# Patient Record
Sex: FEMALE | Race: OTHER | NOT HISPANIC OR LATINO | Employment: PART TIME | URBAN - METROPOLITAN AREA
[De-identification: names, ages, dates, MRNs, and addresses within clinical notes are randomized per-mention and may not be internally consistent; named-entity substitution may affect disease eponyms.]

---

## 2017-08-16 ENCOUNTER — HOSPITAL ENCOUNTER (OUTPATIENT)
Facility: MEDICAL CENTER | Age: 32
End: 2017-08-16
Attending: NURSE PRACTITIONER
Payer: COMMERCIAL

## 2017-08-16 ENCOUNTER — OFFICE VISIT (OUTPATIENT)
Dept: URGENT CARE | Facility: PHYSICIAN GROUP | Age: 32
End: 2017-08-16
Payer: COMMERCIAL

## 2017-08-16 VITALS
DIASTOLIC BLOOD PRESSURE: 70 MMHG | OXYGEN SATURATION: 98 % | HEART RATE: 95 BPM | WEIGHT: 137 LBS | SYSTOLIC BLOOD PRESSURE: 110 MMHG | HEIGHT: 67 IN | RESPIRATION RATE: 12 BRPM | TEMPERATURE: 99.1 F | BODY MASS INDEX: 21.5 KG/M2

## 2017-08-16 DIAGNOSIS — N89.8 VAGINAL ITCHING: ICD-10-CM

## 2017-08-16 DIAGNOSIS — N30.01 ACUTE CYSTITIS WITH HEMATURIA: ICD-10-CM

## 2017-08-16 LAB
APPEARANCE UR: NORMAL
BILIRUB UR STRIP-MCNC: NORMAL MG/DL
COLOR UR AUTO: YELLOW
GLUCOSE UR STRIP.AUTO-MCNC: NORMAL MG/DL
INT CON NEG: NEGATIVE
INT CON POS: POSITIVE
KETONES UR STRIP.AUTO-MCNC: NORMAL MG/DL
LEUKOCYTE ESTERASE UR QL STRIP.AUTO: NORMAL
NITRITE UR QL STRIP.AUTO: NORMAL
PH UR STRIP.AUTO: 6 [PH] (ref 5–8)
POC URINE PREGNANCY TEST: NORMAL
PROT UR QL STRIP: NORMAL MG/DL
RBC UR QL AUTO: NORMAL
SP GR UR STRIP.AUTO: 1.01
UROBILINOGEN UR STRIP-MCNC: NORMAL MG/DL

## 2017-08-16 PROCEDURE — 81002 URINALYSIS NONAUTO W/O SCOPE: CPT | Performed by: NURSE PRACTITIONER

## 2017-08-16 PROCEDURE — 81025 URINE PREGNANCY TEST: CPT | Performed by: NURSE PRACTITIONER

## 2017-08-16 PROCEDURE — 99204 OFFICE O/P NEW MOD 45 MIN: CPT | Performed by: NURSE PRACTITIONER

## 2017-08-16 RX ORDER — FLUCONAZOLE 150 MG/1
150 TABLET ORAL DAILY
Qty: 1 TAB | Refills: 0 | Status: SHIPPED | OUTPATIENT
Start: 2017-08-16 | End: 2017-10-09

## 2017-08-16 RX ORDER — NITROFURANTOIN 25; 75 MG/1; MG/1
100 CAPSULE ORAL 2 TIMES DAILY
Qty: 10 CAP | Refills: 0 | Status: SHIPPED | OUTPATIENT
Start: 2017-08-16 | End: 2017-08-21

## 2017-08-16 ASSESSMENT — PAIN SCALES - GENERAL: PAINLEVEL: 5=MODERATE PAIN

## 2017-08-16 NOTE — PROGRESS NOTES
Chief Complaint   Patient presents with   • UTI     Symtoms of burning, urgency, and blood in urine x5 days       HISTORY OF PRESENT ILLNESS: Patient is a 31 y.o. female who presents today due to five days of urinary burning, urgency, and frequency. Reports some associated pelvic pressure, vaginal itching and a few episodes hematuria. Denies fever, flank pain, N/V. Denies a history of pyelononephritis or kidney stones. Denies a history of UTIs.    Patient Active Problem List    Diagnosis Date Noted   • Postpartum care and examination 2013       Allergies:Review of patient's allergies indicates no known allergies.    Current Outpatient Prescriptions Ordered in [facility]   Medication Sig Dispense Refill   • nitrofurantoin monohydr macro (MACROBID) 100 MG Cap Take 1 Cap by mouth 2 times a day for 5 days. 10 Cap 0   • fluconazole (DIFLUCAN) 150 MG tablet Take 1 Tab by mouth every day. 1 Tab 0   • Norethin-Eth Estrad Biphasic (NECON 10/, 28,) 0.5-35/1-35 MG-MCG TABS Take 1 Tab by mouth every day. 28 Tab 11     No current [facility]-ordered facility-administered medications on file.       Past Medical History   Diagnosis Date   • Anemia    • Headache    • Hypertension      PIH last 2 weeks of pregnancy       Social History   Substance Use Topics   • Smoking status: Never Smoker    • Smokeless tobacco: Never Used   • Alcohol Use: No       Family Status   Relation Status Death Age   • Mother Alive    • Father Alive    • Brother Alive    • Maternal Grandmother     • Maternal Grandfather     • Paternal Grandmother Alive    • Paternal Grandfather       Family History   Problem Relation Age of Onset   • Hypertension Mother    • Hyperlipidemia Mother    • Cancer Maternal Grandmother    • Other Maternal Grandmother      brain tumor       ROS:  Review of Systems   Constitutional: Negative for fever, chills, weight loss and malaise/fatigue.   HENT: Negative for ear pain, nosebleeds, congestion, sore  "throat and neck pain.    Eyes: Negative for vision changes.   Neuro: Negative for headache, sensory changes, weakness, seizure, LOC.   Cardiovascular: Negative for chest pain, palpitations, orthopnea and leg swelling.   Respiratory: Negative for cough, sputum production, shortness of breath and wheezing.   Gastrointestinal: Positive for lower abdominal pressure. Negative for abdominal pain, nausea, vomiting or diarrhea.   Genitourinary: Positive for dysuria, urgency, frequency, hematuria, vaginal itching. Negative for vaginal discharge or flank pain.   Skin: Negative for rash, diaphoresis.     Exam:  Blood pressure 110/70, pulse 95, temperature 37.3 °C (99.1 °F), resp. rate 12, height 1.702 m (5' 7.01\"), weight 62.143 kg (137 lb), last menstrual period 07/25/2017, SpO2 98 %, not currently breastfeeding.  General: well-nourished, well-developed female in NAD  Head: normocephalic, atraumatic  Eyes: PERRLA, no conjunctival injection, acuity grossly intact, lids normal.  Lymph: no cervical adenopathy. No supraclavicular adenopathy.   Neuro: alert and oriented. Cranial nerves 1-12 grossly intact. No sensory deficit.   Cardiovascular: regular rate and rhythm. No edema.  Pulmonary: no distress. Chest is symmetrical with respiration, no wheezes, crackles, or rhonchi.   Abdomen: soft, non-tender, no guarding, no hepatosplenomegaly. No CVA tenderness.   Musculoskeletal: no clubbing, appropriate muscle tone, gait is stable.  Skin: warm, dry, intact, no clubbing, no cyanosis, no rashes.   Psych: appropriate mood, affect, judgement.         Assessment/Plan:  1. Acute cystitis with hematuria  nitrofurantoin monohydr macro (MACROBID) 100 MG Cap    URINE CULTURE(NEW)    POCT Urinalysis    POCT Pregnancy   2. Vaginal itching  fluconazole (DIFLUCAN) 150 MG tablet         Antibiotic as directed. Increase fluid intake. Urine sent for culture. Contingent diflucan prescription given to patient to fill upon meeting criteria of guidelines " discussed.   Supportive care, differential diagnoses, and indications for immediate follow-up discussed with patient.   Pathogenesis of diagnosis discussed including typical length and natural progression.   Instructed to return to clinic or nearest emergency department for any change in condition, further concerns, or worsening of symptoms.  Patient states understanding of the plan of care and discharge instructions.  Instructed to make an appointment, for follow up, with their primary care provider.        Please note that this dictation was created using voice recognition software. I have made every reasonable attempt to correct obvious errors, but I expect that there are errors of grammar and possibly content that I did not discover before finalizing the note.      SHERINE Sinclair.

## 2017-08-16 NOTE — MR AVS SNAPSHOT
"        Naila Mcclelland   2017 3:15 PM   Office Visit   MRN: [id number]    Department:  [facility]   Dept Phone:  [phone]    Description:  Female : 1985   Provider:  RAFAEL Angela           Reason for Visit     UTI Symtoms of burning, urgency, and blood in urine x5 days      Allergies as of 2017     No Known Allergies      You were diagnosed with     Acute cystitis with hematuria   [859457]       Vaginal itching   [[id number]]         Vital Signs     Blood Pressure Pulse Temperature Respirations Height Weight    110/70 mmHg 95 37.3 °C (99.1 °F) 12 1.702 m (5' 7.01\") 62.143 kg (137 lb)    Body Mass Index Oxygen Saturation Last Menstrual Period Breastfeeding? Smoking Status       21.45 kg/m2 98% 2017 No Never Smoker        Basic Information     Date Of Birth Sex Race Ethnicity Preferred Language    1985 Female Other Non- English      Problem List              ICD-10-CM Priority Class Noted - Resolved    Postpartum care and examination    2013 - Present      Health Maintenance        Date Due Completion Dates    PAP SMEAR 2015    IMM INFLUENZA (1) 2017 1/3/2012    IMM DTaP/Tdap/Td Vaccine (2 - Td) 2023            Results     POCT Urinalysis      Component Value Standard Range & Units    POC Color YELLOW Negative    POC Appearance CLOUDY Negative    POC Leukocyte Esterase MOD Negative    POC Nitrites NEG Negative    POC Urobiligen NEG Negative (0.2) mg/dL    POC Protein NEG Negative mg/dL    POC Urine PH 6.0 5.0 - 8.0    POC Blood LG Negative    POC Specific Gravity 1.010 <1.005 - >1.030    POC Ketones NEG Negative mg/dL    POC Biliruben NEG Negative mg/dL    POC Glucose NEG Negative mg/dL                POCT Pregnancy      Component Value Standard Range & Units    POC Urine Pregnancy Test neg Negative    Internal Control Positive Positive     Internal Control Negative Negative                         Current Immunizations    " Influenza TIV (IM) 1/3/2012    MMR/Varicella Combined Vaccine  Incomplete    Tdap Vaccine  Incomplete, 7/9/2013 11:45 PM      Below and/or attached are the medications your provider expects you to take. Review all of your home medications and newly ordered medications with your provider and/or pharmacist. Follow medication instructions as directed by your provider and/or pharmacist. Please keep your medication list with you and share with your provider. Update the information when medications are discontinued, doses are changed, or new medications (including over-the-counter products) are added; and carry medication information at all times in the event of emergency situations     Allergies:  No Known Allergies          Medications  Valid as of: August 16, 2017 -  6:49 PM    Generic Name Brand Name Tablet Size Instructions for use    Fluconazole (Tab) DIFLUCAN 150 MG Take 1 Tab by mouth every day.        Nitrofurantoin Monohyd Macro (Cap) MACROBID 100 MG Take 1 Cap by mouth 2 times a day for 5 days.        Norethin-Eth Estrad Biphasic (Tab) Norethin-Eth Estrad Biphasic 0.5-35/1-35 MG-MCG Take 1 Tab by mouth every day.        .                 Medicines prescribed today were sent to:     [facility] [address] [place] [address] - [address] [facility]    [address] [facility] [address]    Phone: [phone] Fax: [phone]    Open 24 Hours?: No      Medication refill instructions:       If your prescription bottle indicates you have medication refills left, it is not necessary to call your provider’s office. Please contact your pharmacy and they will refill your medication.    If your prescription bottle indicates you do not have any refills left, you may request refills at any time through one of the following ways: The online [employer] system (except Urgent Care), by calling your provider’s office, or by asking your pharmacy to contact your provider’s office with a refill request.  Medication refills are processed only during regular business hours and may not be available until the next business day. Your provider may request additional information or to have a follow-up visit with you prior to refilling your medication.   *Please Note: Medication refills are assigned a new Rx number when refilled electronically. Your pharmacy may indicate that no refills were authorized even though a new prescription for the same medication is available at the pharmacy. Please request the medicine by name with the pharmacy before contacting your provider for a refill.        Your To Do List     Future Labs/Procedures Complete By Expires    URINE CULTURE(NEW)  As directed 8/16/2018         [employer] Access Code: Activation code not generated  Current [employer] Status: Active

## 2017-09-06 ENCOUNTER — OFFICE VISIT (OUTPATIENT)
Dept: URGENT CARE | Facility: PHYSICIAN GROUP | Age: 32
End: 2017-09-06

## 2017-09-06 ENCOUNTER — HOSPITAL ENCOUNTER (OUTPATIENT)
Facility: MEDICAL CENTER | Age: 32
End: 2017-09-06
Attending: NURSE PRACTITIONER
Payer: COMMERCIAL

## 2017-09-06 VITALS
BODY MASS INDEX: 21.19 KG/M2 | RESPIRATION RATE: 14 BRPM | OXYGEN SATURATION: 100 % | HEART RATE: 95 BPM | SYSTOLIC BLOOD PRESSURE: 112 MMHG | HEIGHT: 67 IN | DIASTOLIC BLOOD PRESSURE: 74 MMHG | WEIGHT: 135 LBS | TEMPERATURE: 98.2 F

## 2017-09-06 DIAGNOSIS — R30.0 DYSURIA: ICD-10-CM

## 2017-09-06 DIAGNOSIS — N39.0 URINARY TRACT INFECTION WITH HEMATURIA, SITE UNSPECIFIED: ICD-10-CM

## 2017-09-06 DIAGNOSIS — R31.9 URINARY TRACT INFECTION WITH HEMATURIA, SITE UNSPECIFIED: ICD-10-CM

## 2017-09-06 LAB
APPEARANCE UR: CLEAR
BILIRUB UR STRIP-MCNC: NEGATIVE MG/DL
COLOR UR AUTO: YELLOW
GLUCOSE UR STRIP.AUTO-MCNC: NEGATIVE MG/DL
KETONES UR STRIP.AUTO-MCNC: NEGATIVE MG/DL
LEUKOCYTE ESTERASE UR QL STRIP.AUTO: NORMAL
NITRITE UR QL STRIP.AUTO: NEGATIVE
PH UR STRIP.AUTO: 7 [PH] (ref 5–8)
PROT UR QL STRIP: NEGATIVE MG/DL
RBC UR QL AUTO: NORMAL
SP GR UR STRIP.AUTO: 1
UROBILINOGEN UR STRIP-MCNC: NEGATIVE MG/DL

## 2017-09-06 PROCEDURE — 81002 URINALYSIS NONAUTO W/O SCOPE: CPT | Performed by: NURSE PRACTITIONER

## 2017-09-06 PROCEDURE — 99214 OFFICE O/P EST MOD 30 MIN: CPT | Performed by: NURSE PRACTITIONER

## 2017-09-06 RX ORDER — SULFAMETHOXAZOLE AND TRIMETHOPRIM 800; 160 MG/1; MG/1
1 TABLET ORAL 2 TIMES DAILY
Qty: 6 TAB | Refills: 0 | Status: SHIPPED | OUTPATIENT
Start: 2017-09-06 | End: 2017-09-09

## 2017-09-06 RX ORDER — FLUCONAZOLE 150 MG/1
TABLET ORAL
Qty: 2 TAB | Refills: 1 | Status: SHIPPED | OUTPATIENT
Start: 2017-09-06 | End: 2017-10-09

## 2017-09-06 ASSESSMENT — ENCOUNTER SYMPTOMS
FLANK PAIN: 1
MYALGIAS: 0
CHILLS: 0
NAUSEA: 1
HEADACHES: 0
ABDOMINAL PAIN: 1
FEVER: 0
VOMITING: 0

## 2017-09-06 NOTE — PROGRESS NOTES
"Subjective:      Naila Mcclelland is a 31 y.o. female who presents with UTI (urinary pain x 1 month . lower left back pain and prev yeast infecton x 1 m )            UTI   This is a new problem. The current episode started 1 to 4 weeks ago. The problem occurs constantly. The problem has been unchanged. Associated symptoms include abdominal pain, nausea and urinary symptoms. Pertinent negatives include no chills, fever, headaches, myalgias or vomiting. Associated symptoms comments: + flank pain. Treatments tried: increasing fluids.   Allergies, medications and history reviewed by me today  Seen one month ago for similar symptoms. Was on macrobid.    Review of Systems   Constitutional: Negative for chills and fever.   Gastrointestinal: Positive for abdominal pain and nausea. Negative for vomiting.   Genitourinary: Positive for dysuria, flank pain and hematuria.   Musculoskeletal: Negative for myalgias.   Neurological: Negative for headaches.          Objective:     /74   Pulse 95   Temp 36.8 °C (98.2 °F)   Resp 14   Ht 1.702 m (5' 7\")   Wt 61.2 kg (135 lb)   LMP 09/30/2012 Comment: sure wrote it down  SpO2 100%   Breastfeeding? No   BMI 21.14 kg/m²      Physical Exam   Constitutional: She is oriented to person, place, and time. She appears well-developed and well-nourished. No distress.   Cardiovascular: Normal rate, regular rhythm and normal heart sounds.    No murmur heard.  Pulmonary/Chest: Effort normal and breath sounds normal. No respiratory distress.   Abdominal: Soft. Bowel sounds are normal. There is tenderness in the suprapubic area. There is no CVA tenderness.   Musculoskeletal: Normal range of motion.   Moves all 4 extremities normally   Neurological: She is alert and oriented to person, place, and time.   Skin: Skin is warm and dry.   Psychiatric: She has a normal mood and affect. Her behavior is normal. Thought content normal.   Nursing note and vitals reviewed.              Assessment/Plan: "     1. Dysuria  sulfamethoxazole-trimethoprim (BACTRIM DS) 800-160 MG tablet    fluconazole (DIFLUCAN) 150 MG tablet    URINE CULTURE(NEW)   2. Urinary tract infection with hematuria, site unspecified       Differential diagnosis, natural history, supportive care, and indications for immediate follow-up discussed at length.   Call with culture

## 2017-09-11 ENCOUNTER — TELEPHONE (OUTPATIENT)
Dept: URGENT CARE | Facility: PHYSICIAN GROUP | Age: 32
End: 2017-09-11

## 2017-09-12 ENCOUNTER — TELEPHONE (OUTPATIENT)
Dept: URGENT CARE | Facility: PHYSICIAN GROUP | Age: 32
End: 2017-09-12

## 2017-09-12 DIAGNOSIS — R31.9 URINARY TRACT INFECTION WITH HEMATURIA, SITE UNSPECIFIED: ICD-10-CM

## 2017-09-12 DIAGNOSIS — N39.0 URINARY TRACT INFECTION WITH HEMATURIA, SITE UNSPECIFIED: ICD-10-CM

## 2017-09-12 RX ORDER — NITROFURANTOIN 25; 75 MG/1; MG/1
100 CAPSULE ORAL 2 TIMES DAILY
Qty: 10 QUANTITY SUFFICIENT | Refills: 0 | Status: SHIPPED | OUTPATIENT
Start: 2017-09-12 | End: 2017-10-09

## 2017-09-12 RX ORDER — FLUCONAZOLE 150 MG/1
150 TABLET ORAL DAILY
Qty: 2 TAB | Refills: 1 | Status: SHIPPED | OUTPATIENT
Start: 2017-09-12 | End: 2017-10-09

## 2017-09-12 NOTE — TELEPHONE ENCOUNTER
Patient called requesting results of urine culture. + ecoli, resistant to bactrim. Sent macrobid and diflucan.   she will call if no resolution.

## 2017-10-09 ENCOUNTER — OFFICE VISIT (OUTPATIENT)
Dept: MEDICAL GROUP | Facility: PHYSICIAN GROUP | Age: 32
End: 2017-10-09
Payer: COMMERCIAL

## 2017-10-09 VITALS
HEART RATE: 94 BPM | BODY MASS INDEX: 21.97 KG/M2 | HEIGHT: 67 IN | WEIGHT: 140 LBS | TEMPERATURE: 97.5 F | OXYGEN SATURATION: 99 % | SYSTOLIC BLOOD PRESSURE: 102 MMHG | DIASTOLIC BLOOD PRESSURE: 68 MMHG

## 2017-10-09 DIAGNOSIS — Z00.00 HEALTHCARE MAINTENANCE: ICD-10-CM

## 2017-10-09 DIAGNOSIS — Z23 NEED FOR VACCINATION: ICD-10-CM

## 2017-10-09 DIAGNOSIS — N89.8 VAGINAL ITCHING: ICD-10-CM

## 2017-10-09 DIAGNOSIS — Z30.011 ENCOUNTER FOR INITIAL PRESCRIPTION OF CONTRACEPTIVE PILLS: ICD-10-CM

## 2017-10-09 PROCEDURE — 90686 IIV4 VACC NO PRSV 0.5 ML IM: CPT | Performed by: FAMILY MEDICINE

## 2017-10-09 PROCEDURE — 90471 IMMUNIZATION ADMIN: CPT | Performed by: FAMILY MEDICINE

## 2017-10-09 PROCEDURE — 99214 OFFICE O/P EST MOD 30 MIN: CPT | Mod: 25 | Performed by: FAMILY MEDICINE

## 2017-10-09 RX ORDER — LEVONORGESTREL AND ETHINYL ESTRADIOL 0.1-0.02MG
1 KIT ORAL DAILY
Qty: 28 TAB | Refills: 11 | Status: SHIPPED | OUTPATIENT
Start: 2017-10-09 | End: 2017-10-24

## 2017-10-09 ASSESSMENT — PAIN SCALES - GENERAL: PAINLEVEL: NO PAIN

## 2017-10-09 ASSESSMENT — PATIENT HEALTH QUESTIONNAIRE - PHQ9: CLINICAL INTERPRETATION OF PHQ2 SCORE: 0

## 2017-10-09 NOTE — ASSESSMENT & PLAN NOTE
Patient is also requesting to discuss contraceptive options. Upon discussing different options including IUD,  Hector, and Depo-Provera and OCPs, contraceptive patches etc., patient would like to try oral contraceptive pills. Does not have any history of blood clots, liver disease, migraine headaches. She has taken oral contraceptive pills in the past briefly. She is  with 2 living children and 2 abortions. She is currently sexually active with her  and has not been using any contraception.LMP : 10/07/17.Periods are regular about every 28-30 days.

## 2017-10-09 NOTE — ASSESSMENT & PLAN NOTE
Patient was recently treated for an urinary tract infection. She reports that since then she has been having vaginal itching, however her urinary symptoms have completely resolved. She was treated presumptively for yeast infection but that hasn't helped her itching. She denies any vaginal discharge or bad odor. She is currently having her menstrual period.

## 2017-10-09 NOTE — PROGRESS NOTES
Subjective:   Naila Mcclelland is a 31 y.o. female here today for establishing care and complaining of vaginal itching and discuss birth control options    Vaginal itching  Patient was recently treated for an urinary tract infection. She reports that since then she has been having vaginal itching, however her urinary symptoms have completely resolved. She was treated presumptively for yeast infection but that hasn't helped her itching. She denies any vaginal discharge or bad odor. She is currently having her menstrual period.    Encounter for initial prescription of contraceptive pills  Patient is also requesting to discuss contraceptive options. Upon discussing different options including IUD,  Hector, and Depo-Provera and OCPs, contraceptive patches etc., patient would like to try oral contraceptive pills. Does not have any history of blood clots, liver disease, migraine headaches. She has taken oral contraceptive pills in the past briefly. She is  with 2 living children and 2 abortions. She is currently sexually active with her  and has not been using any contraception.       Current medicines (including changes today)  Current Outpatient Prescriptions   Medication Sig Dispense Refill   • levonorgestrel-ethinyl estradiol (AVIANE, ALESSE, LESSINA) 0.1-20 MG-MCG per tablet Take 1 Tab by mouth every day. Start  after start of menstrual cycle 28 Tab 11     No current facility-administered medications for this visit.      She  has a past medical history of Anemia (); Headache(784.0); and Hypertension (). She also has no past medical history of Addisons disease (CMS-HCC); Adrenal disorder (CMS-HCC); Allergy; Anxiety; Arrhythmia; Arthritis; ASTHMA; Blood transfusion; Cancer (CMS-HCC); CATARACT; CHF (congestive heart failure) (CMS-HCC); Clotting disorder (CMS-HCC); COPD; Cushings syndrome (CMS-HCC); Depression; Diabetes; Diabetic neuropathy (CMS-HCC); EMPHYSEMA; GERD (gastroesophageal reflux disease);  "Glaucoma; Goiter; Heart attack; Heart murmur; HIV (human immunodeficiency virus infection); Hyperlipidemia; IBD (inflammatory bowel disease); Kidney disease; Meningitis; Migraine; Muscle disorder; OSTEOPOROSIS; Parathyroid disorder (CMS-[facility]); Pituitary disease (CMS-HCC); Seizure (CMS-HCC); Sickle cell disease (CMS-HCC); Stroke (CMS-HCC); Substance abuse; Thyroid disease; Tuberculosis; Ulcer (CMS-HCC); or Urinary tract infection, site not specified.    ROS   No chest pain, no shortness of breath, no abdominal pain       Objective:     Blood pressure 102/68, pulse 94, temperature 36.4 °C (97.5 °F), height 1.702 m (5' 7\"), weight 63.5 kg (140 lb), last menstrual period 10/07/2017, SpO2 99 %, not currently breastfeeding. Body mass index is 21.93 kg/m².     PHYSICAL EXAM     GEN: Alert and oriented,well appearing, no acute distress  SKIN: warm, dry to touch, no rashes or lesions in visible areas  PSYCH: mood and affect normal, judgement normal  EYES: Conjunctiva clear, lids normal,pupils equal round and reactive  ENMT: Normal external nose and ears,EACs normal appearing, TM pearly gray with normal light reflex bilaterally,nasal mucosa and turbinates normal appearing without erythema or edema, lips without lesions,good dentition,oropharynx clear  Neck : Trachea midline, no masses or swelling, no thyromegaly  LYMPHATIC : No cervical or supraclavicular lymphadenopathy  RESPIRATORY : Unlabored respiratory effort, no distress noted, clear to auscultation bilaterally, no wheeze, rhonchi, crackles  CARDIOVASCULAR: RRR, S1 S2 normal, no murmurs  MUSCULOSKELETAL : Normal gait and stance, no obvious abnormalities   NEURO: No overt focal neurologic deficits,sensation intact      Assessment and Plan:   The following treatment plan was discussed    1. Vaginal itching  New problem.Will collect wet prep today.await results and treat accordingly  - VAGINAL PATHOGENS DNA PANEL; Future    2. Encounter for initial prescription of " contraceptive pills  New problem  We discussed that birth control pills can increase risk of blood clots, liver tumors, gallbladder disease, and stroke. Side effects can include headache, constipation nausea. They do not protect against STDs. They are not effective if not taken everyday, if more than one pill is missed a backup method should be used.   - levonorgestrel-ethinyl estradiol (AVIANE, ALESSE, LESSINA) 0.1-20 MG-MCG per tablet; Take 1 Tab by mouth every day. Start Sunday after start of menstrual cycle  Dispense: 28 Tab; Refill: 11    3. Need for vaccination  - INFLUENZA VACCINE QUAD INJ >3Y(PF)    4. Healthcare maintenance  - CBC WITH DIFFERENTIAL; Future  - COMP METABOLIC PANEL; Future  - LIPID PROFILE; Future  - TSH; Future  - VITAMIN D,25 HYDROXY; Future      Followup: Return in about 2 weeks (around 10/23/2017) for Pap, GYN Exam.         Please note that this dictation was created using voice recognition software. I have made every reasonable attempt to correct obvious errors, but I expect that there are errors of grammar and possibly content that I did not discover before finalizing the note.

## 2017-10-10 ENCOUNTER — HOSPITAL ENCOUNTER (OUTPATIENT)
Facility: MEDICAL CENTER | Age: 32
End: 2017-10-10
Attending: FAMILY MEDICINE
Payer: COMMERCIAL

## 2017-10-14 RX ORDER — METRONIDAZOLE 500 MG/1
500 TABLET ORAL 2 TIMES DAILY
Qty: 14 TAB | Refills: 0 | Status: SHIPPED | OUTPATIENT
Start: 2017-10-14 | End: 2017-10-18

## 2017-10-16 ENCOUNTER — TELEPHONE (OUTPATIENT)
Dept: MEDICAL GROUP | Facility: PHYSICIAN GROUP | Age: 32
End: 2017-10-16

## 2017-10-16 DIAGNOSIS — E55.9 VITAMIN D DEFICIENCY: ICD-10-CM

## 2017-10-16 DIAGNOSIS — D50.9 MICROCYTIC ANEMIA: ICD-10-CM

## 2017-10-17 ENCOUNTER — TELEPHONE (OUTPATIENT)
Dept: MEDICAL GROUP | Facility: PHYSICIAN GROUP | Age: 32
End: 2017-10-17

## 2017-10-18 ENCOUNTER — TELEPHONE (OUTPATIENT)
Dept: MEDICAL GROUP | Facility: PHYSICIAN GROUP | Age: 32
End: 2017-10-18

## 2017-10-18 RX ORDER — METRONIDAZOLE 7.5 MG/G
GEL TOPICAL
Qty: 1 TUBE | Refills: 0 | Status: SHIPPED | OUTPATIENT
Start: 2017-10-18 | End: 2018-02-21

## 2017-10-19 NOTE — TELEPHONE ENCOUNTER
Please advise her to stop the oral medication.I have sent a prescription of a metronidazole vaginal gel.    Zaira Koroma M.D.

## 2017-10-19 NOTE — TELEPHONE ENCOUNTER
PT STATES THE ANTIBIOTIC IS MAKING HER SICK, IS THERE ANOTHER ONE SHE CAN TAKE THAT WILL HAVE LESS GI UPSET? PLEASE ADVISE

## 2017-10-24 ENCOUNTER — OFFICE VISIT (OUTPATIENT)
Dept: MEDICAL GROUP | Facility: PHYSICIAN GROUP | Age: 32
End: 2017-10-24
Payer: COMMERCIAL

## 2017-10-24 VITALS
SYSTOLIC BLOOD PRESSURE: 100 MMHG | HEART RATE: 111 BPM | OXYGEN SATURATION: 98 % | TEMPERATURE: 97.7 F | BODY MASS INDEX: 21.97 KG/M2 | WEIGHT: 140 LBS | HEIGHT: 67 IN | DIASTOLIC BLOOD PRESSURE: 64 MMHG

## 2017-10-24 DIAGNOSIS — N92.6 IRREGULAR MENSES: ICD-10-CM

## 2017-10-24 LAB
INT CON NEG: NEGATIVE
INT CON POS: POSITIVE
POC URINE PREGNANCY TEST: NORMAL

## 2017-10-24 PROCEDURE — 99214 OFFICE O/P EST MOD 30 MIN: CPT | Performed by: FAMILY MEDICINE

## 2017-10-24 PROCEDURE — 81025 URINE PREGNANCY TEST: CPT | Performed by: FAMILY MEDICINE

## 2017-10-24 RX ORDER — NORETHINDRONE ACETATE AND ETHINYL ESTRADIOL 1MG-20(21)
1 KIT ORAL DAILY
Qty: 28 TAB | Refills: 11 | Status: SHIPPED | OUTPATIENT
Start: 2017-10-24 | End: 2018-02-21

## 2017-10-24 ASSESSMENT — PAIN SCALES - GENERAL: PAINLEVEL: NO PAIN

## 2017-10-24 NOTE — PROGRESS NOTES
Subjective:   Naila Mcclelland is a 31 y.o. female here today for irregular periods and vaginal itching.    HPI :     Patient was recently started on oral contraceptive pills for contraception. She started taking the pills last Sunday along with metronidazole which was also prescribed for bacterial vaginitis. In a couple of days she started feeling very nauseous, sick in the stomach and her heart rate was increasing. Therefore she stopped taking the birth control pills and continued with the antibiotics, however she still had the symptoms. Therefore she stopped the antibiotics as well. She does have a prescription of metronidazole vaginal gel called in when she has not picked up for alternative treatment of bacterial vaginitis. The reason she is here today is because she started having vaginal bleeding 2 days back which looked like old dark blood. She was not intubated for a period. She has also been having mild abdominal cramps. She is still in her period but it has gotten lighter. No abdominal pain, fever, chills. Has been sexually active recently.    Current medicines (including changes today)  Current Outpatient Prescriptions   Medication Sig Dispense Refill   • norethindrone-ethinyl estradiol (JUNEL FE 1/20) 1-20 MG-MCG per tablet Take 1 Tab by mouth every day. 28 Tab 11   • Cholecalciferol 98866 UNIT Cap Take 1 capsule orally once weekly for 8 weeks, followed by 1 capsule twice monthly for maintenance. 30 Cap 0   • metronidazole (METROGEL) 0.75 % gel Insert 5g(one applicatorful) into the vagina once daily at night for 5 days 1 Tube 0     No current facility-administered medications for this visit.      She  has a past medical history of Anemia (2005); Headache(784.0); and Hypertension (2005). She also has no past medical history of Addisons disease (CMS-HCC); Adrenal disorder (CMS-HCC); Allergy; Anxiety; Arrhythmia; Arthritis; ASTHMA; Blood transfusion; Cancer (CMS-HCC); CATARACT; CHF (congestive heart failure)  "(CMS-[facility]); Clotting disorder (CMS-[facility]); COPD; Cushings syndrome (CMS-[facility]); Depression; Diabetes; Diabetic neuropathy (CMS-[facility]); EMPHYSEMA; GERD (gastroesophageal reflux disease); Glaucoma; Goiter; Heart attack; Heart murmur; HIV (human immunodeficiency virus infection); Hyperlipidemia; IBD (inflammatory bowel disease); Kidney disease; Meningitis; Migraine; Muscle disorder; OSTEOPOROSIS; Parathyroid disorder (CMS-HCC); Pituitary disease (CMS-HCC); Seizure (CMS-HCC); Sickle cell disease (CMS-HCC); Stroke (CMS-HCC); Substance abuse; Thyroid disease; Tuberculosis; Ulcer (CMS-[facility]); or Urinary tract infection, site not specified.    ROS   No chest pain, no shortness of breath, no abdominal pain  No cough, no weakness     Objective:     Blood pressure 100/64, pulse (!) 111, temperature 36.5 °C (97.7 °F), height 1.702 m (5' 7\"), weight 63.5 kg (140 lb), last menstrual period 10/22/2017, SpO2 98 %, not currently breastfeeding. Body mass index is 21.93 kg/m².     PHYSICAL EXAM     GEN: Alert and oriented,well appearing, no acute distress  SKIN: warm, dry to touch, no rashes or lesions in visible areas  PSYCH: mood and affect normal, judgement normal  EYES: Conjunctiva clear, lids normal,pupils equal round and reactive  ENMT: Normal external nose and ears,EACs normal appearing, TM pearly gray with normal light reflex bilaterally,nasal mucosa and turbinates normal appearing without erythema or edema, lips without lesions,good dentition,oropharynx clear  Neck : Trachea midline, no masses or swelling, no thyromegaly  LYMPHATIC : No cervical or supraclavicular lymphadenopathy  RESPIRATORY : Unlabored respiratory effort, no distress noted, clear to auscultation bilaterally, no wheeze, rhonchi, crackles  CARDIOVASCULAR: RRR, S1 S2 normal, no murmurs , no edema of the extremities  GI: Soft, Non tender, No rebound or guarding, no hepatosplenomegaly, no masses  MUSCULOSKELETAL : Normal gait and stance, no obvious abnormalities   NEURO: " No overt focal neurologic deficits,sensation intact        Assessment and Plan:   The following treatment plan was discussed    1. Irregular menses  New problem.Pregnancy test negative.  Exam normal.Reassured patient that bleeding might be related to the recent OCPs that she started taking and stopped.New Rx for OCP given today with instructions.Counseled her that she may have irregular periods for first 3-6 months.discussed possible side effects.  - norethindrone-ethinyl estradiol (JUNEL FE 1/20) 1-20 MG-MCG per tablet; Take 1 Tab by mouth every day.  Dispense: 28 Tab; Refill: 11  - POCT Pregnancy      Followup: Return in about 3 weeks (around 11/14/2017) for Pap, GYN Exam.         Please note that this dictation was created using voice recognition software. I have made every reasonable attempt to correct obvious errors, but I expect that there are errors of grammar and possibly content that I did not discover before finalizing the note.

## 2017-10-26 ENCOUNTER — TELEPHONE (OUTPATIENT)
Dept: MEDICAL GROUP | Facility: PHYSICIAN GROUP | Age: 32
End: 2017-10-26

## 2017-10-30 ENCOUNTER — OFFICE VISIT (OUTPATIENT)
Dept: MEDICAL GROUP | Facility: LAB | Age: 32
End: 2017-10-30
Payer: COMMERCIAL

## 2017-10-30 VITALS
WEIGHT: 137 LBS | TEMPERATURE: 99.2 F | HEIGHT: 67 IN | RESPIRATION RATE: 12 BRPM | BODY MASS INDEX: 21.5 KG/M2 | OXYGEN SATURATION: 98 % | DIASTOLIC BLOOD PRESSURE: 60 MMHG | HEART RATE: 96 BPM | SYSTOLIC BLOOD PRESSURE: 106 MMHG

## 2017-10-30 DIAGNOSIS — L29.9 PRURITIC DISORDER: ICD-10-CM

## 2017-10-30 PROCEDURE — 99214 OFFICE O/P EST MOD 30 MIN: CPT | Performed by: FAMILY MEDICINE

## 2017-10-30 ASSESSMENT — ENCOUNTER SYMPTOMS
FEVER: 0
NAUSEA: 0
VOMITING: 0
HEADACHES: 0

## 2017-10-30 NOTE — PROGRESS NOTES
Subjective:      Naila Mcclelland is a 31 y.o. female who presents with Other (left arm / itchy and red/ warm to touch X 1 day)    Chief Complaint   Patient presents with   • Other     left arm / itchy and red/ warm to touch X 1 day             HPI     Patient is here with above. She is here for a same day access appointment.     This is a new problem to discuss today. Patient reports that yesterday she noted some itching around the inner part of her left arm around the elbow. She states that she scratching the area and it was getting worse. She then noticed swelling in the area last night and then the swelling and itching was worse this morning so she called to make an appointment. She says she is has not tried anything for this. She's had no recent history of an IV. No recent travel, no new laundry soap. She does have a couple of parakeets at home that she's had since August. She states that the exact same thing happened last month however it was her right arm and now it is her left arm. She states this resolved completely on its own without treatment within 2 days.    Patient denies any shortness of breath, problems swallowing, any other associated symptoms.    Patient Active Problem List    Diagnosis Date Noted   • Irregular menses 10/24/2017   • Vitamin D deficiency 10/16/2017   • Vaginal itching 10/09/2017   • Encounter for initial prescription of contraceptive pills 10/09/2017   • Microcytic anemia 05/02/2013     Family History   Problem Relation Age of Onset   • Hypertension Mother    • Hyperlipidemia Mother    • Cancer Maternal Grandmother      Brain tumor   • Other Maternal Grandmother      brain tumor     Social History     Social History   • Marital status:      Spouse name: N/A   • Number of children: N/A   • Years of education: N/A     Occupational History   • Not on file.     Social History Main Topics   • Smoking status: Never Smoker   • Smokeless tobacco: Never Used   • Alcohol use No   • Drug use:  "No   • Sexual activity: Yes     Partners: Male     Birth control/ protection: Condom     Other Topics Concern   • Not on file     Social History Narrative   • No narrative on file       Current Outpatient Prescriptions:   •  norethindrone-ethinyl estradiol (JUNEL FE 1/20) 1-20 MG-MCG per tablet, Take 1 Tab by mouth every day., Disp: 28 Tab, Rfl: 11  •  metronidazole (METROGEL) 0.75 % gel, Insert 5g(one applicatorful) into the vagina once daily at night for 5 days, Disp: 1 Tube, Rfl: 0  •  Cholecalciferol 00713 UNIT Cap, Take 1 capsule orally once weekly for 8 weeks, followed by 1 capsule twice monthly for maintenance., Disp: 30 Cap, Rfl: 0    Review of Systems   Constitutional: Negative for fever.   HENT: Negative for congestion and ear pain.    Gastrointestinal: Negative for nausea and vomiting.   Skin: Positive for itching and rash.        No rash in other locations    Neurological: Negative for headaches.          Objective:     /60   Pulse 96   Temp 37.3 °C (99.2 °F)   Resp 12   Ht 1.702 m (5' 7\")   Wt 62.1 kg (137 lb)   LMP 10/22/2017   SpO2 98%   BMI 21.46 kg/m²      Physical Exam   Constitutional: She appears well-developed and well-nourished. She is active and cooperative.  Non-toxic appearance.   HENT:   Head: Normocephalic.   Eyes: Conjunctivae and EOM are normal.   Cardiovascular: Normal rate and regular rhythm.    Pulmonary/Chest: Effort normal and breath sounds normal. No tachypnea. No respiratory distress. She has no decreased breath sounds. She has no wheezes. She has no rhonchi. She has no rales.   Lymphadenopathy:     She has no axillary adenopathy.   No axillary lymphadenopathy on the left, no streaking   Neurological: She is alert. She is not disoriented.   Skin: Skin is warm and dry. She is not diaphoretic.        Area of swelling well demarcated. Mild erythema at border. No insect bites, lesions. No cords noted. No streaking   Psychiatric: She has a normal mood and affect. Her " speech is normal and behavior is normal.               Assessment/Plan:     1. Pruritic disorder  Unclear what causing patient's reaction. Likely exposure to allergen. She states that she has had the exact same thing happened on her right arm about a month ago and it resolved within 2 days. Referral to allergist for allergy testing has been completed. Patient is to try over-the-counter Claritin or Allegra or Benadryl and see if this helps with her symptoms. If no improvement in her symptoms she is to let me know. She has no shortness of breath, trouble swallowing.  - REFERRAL TO ALLERGY    Please note that this dictation was created using voice recognition software. I have made every reasonable attempt to correct obvious errors, but I expect that there are errors of grammar and possibly content that I did not discover before finalizing the note.

## 2017-12-13 DIAGNOSIS — D50.8 IRON DEFICIENCY ANEMIA SECONDARY TO INADEQUATE DIETARY IRON INTAKE: ICD-10-CM

## 2017-12-13 PROBLEM — D50.9 IRON DEFICIENCY ANEMIA: Status: ACTIVE | Noted: 2017-12-13

## 2017-12-13 RX ORDER — LANOLIN ALCOHOL/MO/W.PET/CERES
325 CREAM (GRAM) TOPICAL 2 TIMES DAILY
Qty: 60 TAB | Refills: 5 | Status: SHIPPED | OUTPATIENT
Start: 2017-12-13 | End: 2018-02-21

## 2018-02-21 ENCOUNTER — OFFICE VISIT (OUTPATIENT)
Dept: MEDICAL GROUP | Facility: PHYSICIAN GROUP | Age: 33
End: 2018-02-21
Payer: COMMERCIAL

## 2018-02-21 VITALS
HEIGHT: 67 IN | OXYGEN SATURATION: 95 % | DIASTOLIC BLOOD PRESSURE: 62 MMHG | TEMPERATURE: 98.2 F | HEART RATE: 88 BPM | SYSTOLIC BLOOD PRESSURE: 110 MMHG | RESPIRATION RATE: 12 BRPM | BODY MASS INDEX: 21.97 KG/M2 | WEIGHT: 140 LBS

## 2018-02-21 DIAGNOSIS — R76.8 POSITIVE ANA (ANTINUCLEAR ANTIBODY): ICD-10-CM

## 2018-02-21 DIAGNOSIS — D50.8 IRON DEFICIENCY ANEMIA SECONDARY TO INADEQUATE DIETARY IRON INTAKE: ICD-10-CM

## 2018-02-21 DIAGNOSIS — Z91.09 ENVIRONMENTAL ALLERGIES: ICD-10-CM

## 2018-02-21 DIAGNOSIS — R79.89 ABNORMAL TSH: ICD-10-CM

## 2018-02-21 PROCEDURE — 99213 OFFICE O/P EST LOW 20 MIN: CPT | Performed by: FAMILY MEDICINE

## 2018-02-21 NOTE — PROGRESS NOTES
"Subjective:   Naila Mcclelland is a 32 y.o. female here today for reviewing lab results       HPI :    Was having recurrent hives recently.Seen allergist and had skin testing   Was found to be allergic to Grass, weed, tuna.Has been prescribed Epipen.Had extensive testing and was found to have slightly elevated TSH with normal T4 level.  She also has significant iron deficiency anemia and is currently taking iron supplements twice daily.Will need repeat labs.No complaints today.      Current medicines (including changes today)  Current Outpatient Prescriptions   Medication Sig Dispense Refill   • Cholecalciferol 68875 UNIT Cap Take 1 capsule orally once weekly for 8 weeks, followed by 1 capsule twice monthly for maintenance. 30 Cap 0     No current facility-administered medications for this visit.      She  has a past medical history of Anemia (2005); Headache(784.0); and Hypertension (2005). She also has no past medical history of Addisons disease (CMS-[facility]); Adrenal disorder (CMS-HCC); Allergy; Anxiety; Arrhythmia; Arthritis; ASTHMA; Blood transfusion; Cancer (CMS-[facility]); CATARACT; CHF (congestive heart failure) (CMS-HCC); Clotting disorder (CMS-[facility]); COPD; Cushings syndrome (CMS-[facility]); Depression; Diabetes; Diabetic neuropathy (CMS-[facility]); EMPHYSEMA; GERD (gastroesophageal reflux disease); Glaucoma; Goiter; Heart attack; Heart murmur; HIV (human immunodeficiency virus infection); Hyperlipidemia; IBD (inflammatory bowel disease); Kidney disease; Meningitis; Migraine; Muscle disorder; OSTEOPOROSIS; Parathyroid disorder (CMS-[facility]); Pituitary disease (CMS-[facility]); Seizure (CMS-[facility]); Sickle cell disease (CMS-[facility]); Stroke (CMS-[facility]); Substance abuse; Thyroid disease; Tuberculosis; Ulcer (CMS-[facility]); or Urinary tract infection, site not specified.    ROS   No chest pain, no shortness of breath, no abdominal pain       Objective:     Blood pressure 110/62, pulse 88, temperature 36.8 °C (98.2 °F), resp. rate 12, height 1.702 m (5' 7\"), " weight 63.5 kg (140 lb), SpO2 95 %. Body mass index is 21.93 kg/m².     PHYSICAL EXAM     GEN: Alert and oriented,well appearing, no acute distress  SKIN: warm, dry to touch, no rashes or lesions in visible areas  PSYCH: mood and affect normal, judgement normal  RESPIRATORY : Unlabored respiratory effort, no distress noted, clear to auscultation bilaterally, no wheeze, rhonchi, crackles  CARDIOVASCULAR: RRR, S1 S2 normal, no murmurs , gallop , no carotid bruit, no edema of the extremities, pedal pulses B/L 2+  GI: Soft, Non tender, No rebound or guarding, no hepatosplenomegaly, no masses  MUSCULOSKELETAL : Normal gait and stance, no obvious abnormalities   NEURO: No overt focal neurologic deficits,sensation intact      Assessment and Plan:   The following treatment plan was discussed    1. Environmental allergies  Continue f/u with Allergist    2. Iron deficiency anemia secondary to inadequate dietary iron intake  Continue iron supplements.Repeat labs.  - CBC WITH DIFFERENTIAL; Future  - FERRITIN; Future  - IRON/TOTAL IRON BIND; Future    3. Abnormal TSH  Mildly elevated TSH with normal T4  Will repeat labs in 4 weeks.Patient asymptomatic.  If abnormal, will proceed with treatment.  - TSH WITH REFLEX TO FT4; Future      Followup: Return if symptoms worsen or fail to improve.         Please note that this dictation was created using voice recognition software. I have made every reasonable attempt to correct obvious errors, but I expect that there are errors of grammar and possibly content that I did not discover before finalizing the note.

## 2018-02-25 PROBLEM — N89.8 VAGINAL ITCHING: Status: RESOLVED | Noted: 2017-10-09 | Resolved: 2018-02-25

## 2018-02-25 PROBLEM — N92.6 IRREGULAR MENSES: Status: RESOLVED | Noted: 2017-10-24 | Resolved: 2018-02-25

## 2018-02-25 PROBLEM — R76.8 POSITIVE ANA (ANTINUCLEAR ANTIBODY): Status: ACTIVE | Noted: 2018-02-25

## 2018-02-25 PROBLEM — Z30.011 ENCOUNTER FOR INITIAL PRESCRIPTION OF CONTRACEPTIVE PILLS: Status: RESOLVED | Noted: 2017-10-09 | Resolved: 2018-02-25

## 2018-06-23 ENCOUNTER — OFFICE VISIT (OUTPATIENT)
Dept: URGENT CARE | Facility: PHYSICIAN GROUP | Age: 33
End: 2018-06-23
Payer: COMMERCIAL

## 2018-06-23 VITALS
WEIGHT: 136 LBS | BODY MASS INDEX: 21.35 KG/M2 | TEMPERATURE: 99 F | RESPIRATION RATE: 13 BRPM | HEART RATE: 83 BPM | SYSTOLIC BLOOD PRESSURE: 104 MMHG | HEIGHT: 67 IN | DIASTOLIC BLOOD PRESSURE: 64 MMHG | OXYGEN SATURATION: 99 %

## 2018-06-23 DIAGNOSIS — B02.9 HERPES ZOSTER WITHOUT COMPLICATION: ICD-10-CM

## 2018-06-23 PROCEDURE — 99214 OFFICE O/P EST MOD 30 MIN: CPT | Performed by: NURSE PRACTITIONER

## 2018-06-23 RX ORDER — VALACYCLOVIR HYDROCHLORIDE 1 G/1
1000 TABLET, FILM COATED ORAL 3 TIMES DAILY
Qty: 21 TAB | Refills: 0 | Status: SHIPPED | OUTPATIENT
Start: 2018-06-23 | End: 2018-07-02

## 2018-06-23 RX ORDER — PNV NO.95/FERROUS FUM/FOLIC AC 28MG-0.8MG
TABLET ORAL
COMMUNITY
End: 2018-11-30 | Stop reason: SDUPTHER

## 2018-06-23 ASSESSMENT — ENCOUNTER SYMPTOMS
DIZZINESS: 0
CHILLS: 0
DIARRHEA: 0
FEVER: 0
BACK PAIN: 1
HEADACHES: 0
NAUSEA: 0

## 2018-06-23 NOTE — PROGRESS NOTES
"Subjective:      Naila Mcclelland is a 32 y.o. female who presents with Rash (Chest. X 3 days)            HPI New problem. 32 year old female with rash to chest area x 3 days. She denies pain but she does have itching. No new exposures. History of chickepox as a child. Started as pain around her left shoulder blade. Denies fever, chills, myalgia, nausea or diarrhea. She has not taken any medication for this.  Metronidazole  Current Outpatient Prescriptions on File Prior to Visit   Medication Sig Dispense Refill   • Cholecalciferol 90684 UNIT Cap Take 1 capsule orally once weekly for 8 weeks, followed by 1 capsule twice monthly for maintenance. 30 Cap 0     No current facility-administered medications on file prior to visit.      Social History     Social History   • Marital status:      Spouse name: N/A   • Number of children: N/A   • Years of education: N/A     Occupational History   • Not on file.     Social History Main Topics   • Smoking status: Never Smoker   • Smokeless tobacco: Never Used   • Alcohol use No   • Drug use: No   • Sexual activity: Yes     Partners: Male     Birth control/ protection: Condom     Other Topics Concern   • Not on file     Social History Narrative   • No narrative on file     family history includes Cancer in her maternal grandmother; Hyperlipidemia in her mother; Hypertension in her mother; Other in her maternal grandmother.      Review of Systems   Constitutional: Negative for chills, fever and malaise/fatigue.   Gastrointestinal: Negative for diarrhea and nausea.   Musculoskeletal: Positive for back pain.   Skin: Positive for itching and rash.   Neurological: Negative for dizziness and headaches.          Objective:     /64   Pulse 83   Temp 37.2 °C (99 °F)   Resp 13   Ht 1.702 m (5' 7\")   Wt 61.7 kg (136 lb)   SpO2 99%   BMI 21.30 kg/m²      Physical Exam   Constitutional: She is oriented to person, place, and time. She appears well-developed and well-nourished. No " distress.   Cardiovascular: Normal rate, regular rhythm and normal heart sounds.    No murmur heard.  Pulmonary/Chest: Effort normal.   Musculoskeletal: Normal range of motion.   Neurological: She is alert and oriented to person, place, and time.   Skin: Skin is warm and dry. Rash noted.   She has a cluster of vesicular lesions consistent with herpes zoster infection to inner left breast area.   Nursing note and vitals reviewed.              Assessment/Plan:     1. Herpes zoster without complication  valacyclovir (VALTREX) 1 GM Tab     May use otc hydrocortisone cream for this for the itching.  Patient handout regarding this diagnosis included in avs.  Differential diagnosis, natural history, supportive care, and indications for immediate follow-up discussed at length.

## 2018-06-24 ENCOUNTER — TELEPHONE (OUTPATIENT)
Dept: URGENT CARE | Facility: PHYSICIAN GROUP | Age: 33
End: 2018-06-24

## 2018-06-24 DIAGNOSIS — B02.8 HERPES ZOSTER WITH OTHER COMPLICATION: ICD-10-CM

## 2018-06-24 RX ORDER — ACYCLOVIR 200 MG/5ML
800 SUSPENSION ORAL
Qty: 700 ML | Refills: 0 | Status: SHIPPED | OUTPATIENT
Start: 2018-06-24 | End: 2018-07-01

## 2018-06-24 NOTE — TELEPHONE ENCOUNTER
Pt states pills are too big to swallow, and is wondering if she can get a liquid, pt also states she is having worse sx, please advise.  Thank you.

## 2018-07-02 ENCOUNTER — OFFICE VISIT (OUTPATIENT)
Dept: MEDICAL GROUP | Facility: PHYSICIAN GROUP | Age: 33
End: 2018-07-02
Payer: COMMERCIAL

## 2018-07-02 VITALS
BODY MASS INDEX: 21.19 KG/M2 | DIASTOLIC BLOOD PRESSURE: 64 MMHG | SYSTOLIC BLOOD PRESSURE: 114 MMHG | HEIGHT: 67 IN | OXYGEN SATURATION: 99 % | HEART RATE: 84 BPM | WEIGHT: 135 LBS | TEMPERATURE: 98.9 F

## 2018-07-02 DIAGNOSIS — R76.8 POSITIVE ANA (ANTINUCLEAR ANTIBODY): ICD-10-CM

## 2018-07-02 DIAGNOSIS — D50.8 IRON DEFICIENCY ANEMIA SECONDARY TO INADEQUATE DIETARY IRON INTAKE: ICD-10-CM

## 2018-07-02 DIAGNOSIS — E55.9 VITAMIN D DEFICIENCY: ICD-10-CM

## 2018-07-02 DIAGNOSIS — B02.9 HERPES ZOSTER WITHOUT COMPLICATION: ICD-10-CM

## 2018-07-02 PROBLEM — D68.51: Status: ACTIVE | Noted: 2018-02-25

## 2018-07-02 PROCEDURE — 99213 OFFICE O/P EST LOW 20 MIN: CPT | Performed by: FAMILY MEDICINE

## 2018-07-02 ASSESSMENT — PAIN SCALES - GENERAL: PAINLEVEL: NO PAIN

## 2018-07-03 NOTE — PROGRESS NOTES
Subjective:   Naila Mcclelland is a 32 y.o. female here today for follow issues :    HPI :    Herpes zoster without complication  Recently was diagnosed with shingles on the chest 1 week back.Prescribed valacyclovir.Lesions have crusted over, not much pain, no itching, no new lesions.Brother also had shingles few years back.No known autoimmune conditions.    Iron deficiency anemia  Taking iron supplements daily.Due for repeat labs.Denies any fatigue, CP, SOB.    Positive STELLA (antinuclear antibody)  Was found to have STELLA positive during labs done by allergist.No arthralgia, malar rash.Was referred to Rheumatology, was told it could be false positive, requested more work up before being seen.We do not have the actual lab results.       Current medicines (including changes today)  Current Outpatient Prescriptions   Medication Sig Dispense Refill   • Ferrous Sulfate (IRON) 325 (65 Fe) MG Tab Take  by mouth.     • Cholecalciferol 41047 UNIT Cap Take 1 capsule orally once weekly for 8 weeks, followed by 1 capsule twice monthly for maintenance. 30 Cap 0     No current facility-administered medications for this visit.      She  has a past medical history of Anemia (2005); Headache(784.0); and Hypertension (2005). She also has no past medical history of Addisons disease (HCC); Adrenal disorder (HCC); Allergy; Anxiety; Arrhythmia; Arthritis; ASTHMA; Blood transfusion; Cancer (HCC); CATARACT; CHF (congestive heart failure) ([facility]); Clotting disorder (HCC); COPD; Cushings syndrome (HCC); Depression; Diabetes; Diabetic neuropathy (HCC); EMPHYSEMA; GERD (gastroesophageal reflux disease); Glaucoma; Goiter; Heart attack (HCC); Heart murmur; HIV (human immunodeficiency virus infection); Hyperlipidemia; IBD (inflammatory bowel disease); Kidney disease; Meningitis; Migraine; Muscle disorder; OSTEOPOROSIS; Parathyroid disorder (HCC); Pituitary disease (HCC); Seizure (HCC); Sickle cell disease (HCC); Stroke (HCC); Substance abuse; Thyroid  "disease; Tuberculosis; Ulcer; or Urinary tract infection, site not specified.    ROS   No chest pain, no shortness of breath, no abdominal pain       Objective:     Blood pressure 114/64, pulse 84, temperature 37.2 °C (98.9 °F), height 1.702 m (5' 7\"), weight 61.2 kg (135 lb), SpO2 99 %. Body mass index is 21.14 kg/m².    PHYSICAL EXAM     GEN: Alert and oriented,well appearing, no acute distress  SKIN: warm, dry to touch, erythematous crusted papules on the upper chest wall  PSYCH: mood and affect normal, judgement normal  RESPIRATORY : Unlabored respiratory effort, no distress noted, clear to auscultation bilaterally, no wheeze, rhonchi, crackles  CARDIOVASCULAR: RRR, S1 S2 normal, no murmurs  MUSCULOSKELETAL : Normal gait and stance, no obvious abnormalities   NEURO: No overt focal neurologic deficits,sensation intact      Assessment and Plan:   The following treatment plan was discussed    1. Herpes zoster without complication  Rash resolving.Discussed skin care.Monitor.Labs below ordered to R/O any underlying autoimmune conditions in light of positive STELLA reported by allergist    2. Vitamin D deficiency  Continue Vitamin D supplements.  - VITAMIN D,25 HYDROXY; Future    3. Positive STELLA (antinuclear antibody)  - STELLA ANTIBODY WITH REFLEX; Future  - RHEUMATOID ARTHRITIS FACTOR; Future  - WESTERGREN SED RATE; Future    4. Iron deficiency anemia secondary to inadequate dietary iron intake  Continue iron supplements.Follow up labs to be completed.      Followup: Return in about 3 months (around 10/2/2018) for establish with new PCP.         Please note that this dictation was created using voice recognition software. I have made every reasonable attempt to correct obvious errors, but I expect that there are errors of grammar and possibly content that I did not discover before finalizing the note.  "

## 2018-07-03 NOTE — ASSESSMENT & PLAN NOTE
Was found to have STELLA positive during labs done by allergist.No arthralgia, malar rash.Was referred to Rheumatology, was told it could be false positive, requested more work up before being seen.We do not have the actual lab results.

## 2018-07-03 NOTE — ASSESSMENT & PLAN NOTE
Recently was diagnosed with shingles on the chest 1 week back.Prescribed valacyclovir.Lesions have crusted over, not much pain, no itching, no new lesions.Brother also had shingles few years back.No known autoimmune conditions.

## 2018-10-19 ENCOUNTER — OFFICE VISIT (OUTPATIENT)
Dept: URGENT CARE | Facility: PHYSICIAN GROUP | Age: 33
End: 2018-10-19
Payer: COMMERCIAL

## 2018-10-19 VITALS
WEIGHT: 134 LBS | BODY MASS INDEX: 21.03 KG/M2 | RESPIRATION RATE: 12 BRPM | DIASTOLIC BLOOD PRESSURE: 70 MMHG | HEART RATE: 80 BPM | OXYGEN SATURATION: 98 % | HEIGHT: 67 IN | SYSTOLIC BLOOD PRESSURE: 110 MMHG | TEMPERATURE: 99.3 F

## 2018-10-19 DIAGNOSIS — L53.9 ARM ERYTHEMA: ICD-10-CM

## 2018-10-19 PROCEDURE — 99214 OFFICE O/P EST MOD 30 MIN: CPT | Performed by: NURSE PRACTITIONER

## 2018-10-19 RX ORDER — CEPHALEXIN 500 MG/1
500 CAPSULE ORAL 4 TIMES DAILY
Qty: 28 CAP | Refills: 0 | Status: SHIPPED | OUTPATIENT
Start: 2018-10-19 | End: 2018-10-26

## 2018-10-19 ASSESSMENT — ENCOUNTER SYMPTOMS
CONSTITUTIONAL NEGATIVE: 1
RESPIRATORY NEGATIVE: 1
WHEEZING: 0
TINGLING: 1
SHORTNESS OF BREATH: 0
PSYCHIATRIC NEGATIVE: 1
GASTROINTESTINAL NEGATIVE: 1
CARDIOVASCULAR NEGATIVE: 1

## 2018-10-19 NOTE — PROGRESS NOTES
"Subjective:      Naila Mcclelland is a 32 y.o. female who presents with Wrist Pain (x this morning, bumb on wrist/ ichy/ numb )        HPI    The patient presents to urgent care today with complaints with right arm erythema. States that this morning she noticed some swelling and redness to her right palm. Since then the redness has traveled up her right forearm. The area is tender, itchy, and has intermittent numbness. She denies fever, chills, malaise, joint pain, shortness of breath, wheezing or oral swelling. Admits to history of hives for the past year in which she sees an allergist. She feels this feels different. She has taken a Claritin today for treatment. She otherwise denies any known bite, injury or trauma to the area.     Past Medical History:   Diagnosis Date   • Anemia 2005   • Headache(784.0)    • Hypertension 2005    PIH last 2 weeks of pregnancy     Past Surgical History:   Procedure Laterality Date   • PRIMARY C SECTION  7/9/2013    Performed by Monet Han M.D. at [facility]   • DILATION AND CURETTAGE  2010,2011    due to SAB     Current Outpatient Prescriptions on File Prior to Visit   Medication Sig Dispense Refill   • Ferrous Sulfate (IRON) 325 (65 Fe) MG Tab Take  by mouth.       No current facility-administered medications on file prior to visit.      ALL: Metronidazole      Review of Systems   Constitutional: Negative.    HENT: Negative.    Respiratory: Negative.  Negative for shortness of breath and wheezing.    Cardiovascular: Negative.    Gastrointestinal: Negative.    Musculoskeletal:        Right forearm swelling, erythema, pain, itching   Skin:        Swelling, erythema, pain, itching   Neurological: Positive for tingling.   Endo/Heme/Allergies:        History of hives   Psychiatric/Behavioral: Negative.           Objective:     /70 (BP Location: Right arm, Patient Position: Sitting)   Pulse 80   Temp 37.4 °C (99.3 °F) (Temporal)   Resp 12   Ht 1.702 m (5' 7\")  "  Wt 60.8 kg (134 lb)   SpO2 98%   BMI 20.99 kg/m²      Physical Exam   Constitutional: She is oriented to person, place, and time. Vital signs are normal. She appears well-developed and well-nourished. She is active. She does not have a sickly appearance. She does not appear ill. No distress.   HENT:   Head: Normocephalic and atraumatic.   Right Ear: External ear normal.   Left Ear: External ear normal.   Nose: Nose normal.   Mouth/Throat: Oropharynx is clear and moist.   Eyes: Pupils are equal, round, and reactive to light. Conjunctivae are normal. Right eye exhibits no discharge. Left eye exhibits no discharge. No scleral icterus.   Neck: Normal range of motion. Neck supple. No JVD present. No tracheal deviation present.   Cardiovascular: Normal rate, regular rhythm, normal heart sounds and intact distal pulses.    Pulmonary/Chest: Effort normal and breath sounds normal. No stridor. No respiratory distress. She has no wheezes.   Musculoskeletal: Normal range of motion. She exhibits no edema or deformity.        Right wrist: She exhibits tenderness and swelling. She exhibits normal range of motion, no bony tenderness, no effusion, no crepitus, no deformity and no laceration.        Arms:  Lymphadenopathy:     She has no cervical adenopathy.   Neurological: She is alert and oriented to person, place, and time. She has normal strength. No cranial nerve deficit or sensory deficit. She exhibits normal muscle tone. Coordination and gait normal. GCS eye subscore is 4. GCS verbal subscore is 5. GCS motor subscore is 6.   Skin: Skin is warm and dry. Capillary refill takes less than 2 seconds. No bruising, no ecchymosis, no laceration and no rash noted. She is not diaphoretic. There is erythema. No pallor.   Psychiatric: She has a normal mood and affect. Her behavior is normal. Judgment and thought content normal.   Vitals reviewed.              Assessment/Plan:     1. Arm erythema    - cefTRIAXone (ROCEPHIN) 1 g,  lidocaine (XYLOCAINE) 1 % 3.6 mL for IM use; 1 g by Intramuscular route Once.  - cephALEXin (KEFLEX) 500 MG Cap; Take 1 Cap by mouth 4 times a day for 7 days.  Dispense: 28 Cap; Refill: 0        I have discussed potential for infectious and/or allergic etiology. Will cover with abx due to rapid progression of erythema, tolerated rocephin in clinic. Benadryl for the next 48 hours. Line drawn around erythema, monitor closely, STRICT ER precautions.   Supportive care, differential diagnoses, and indications for immediate follow-up discussed with patient.   Pathogenesis of diagnosis discussed including typical length and natural progression.   Instructed to return to clinic or nearest emergency department for any change in condition, further concerns, or worsening of symptoms.  Patient states understanding of the plan of care and discharge instructions.  Instructed to make an appointment, for follow up, with her primary care provider.        SHERINE Sinclair.

## 2018-11-12 ENCOUNTER — OFFICE VISIT (OUTPATIENT)
Dept: INTERNAL MEDICINE | Facility: MEDICAL CENTER | Age: 33
End: 2018-11-12
Payer: COMMERCIAL

## 2018-11-12 VITALS
HEART RATE: 80 BPM | DIASTOLIC BLOOD PRESSURE: 70 MMHG | TEMPERATURE: 97.2 F | SYSTOLIC BLOOD PRESSURE: 102 MMHG | WEIGHT: 138.8 LBS | BODY MASS INDEX: 21.79 KG/M2 | OXYGEN SATURATION: 98 % | HEIGHT: 67 IN | RESPIRATION RATE: 18 BRPM

## 2018-11-12 DIAGNOSIS — Z23 NEED FOR VACCINATION: ICD-10-CM

## 2018-11-12 DIAGNOSIS — N96 HISTORY OF MULTIPLE MISCARRIAGES: ICD-10-CM

## 2018-11-12 DIAGNOSIS — R76.8 POSITIVE ANA (ANTINUCLEAR ANTIBODY): ICD-10-CM

## 2018-11-12 DIAGNOSIS — Z91.09 ENVIRONMENTAL ALLERGIES: ICD-10-CM

## 2018-11-12 DIAGNOSIS — R79.89 ABNORMAL TSH: ICD-10-CM

## 2018-11-12 DIAGNOSIS — E55.9 VITAMIN D DEFICIENCY: ICD-10-CM

## 2018-11-12 DIAGNOSIS — R53.83 FATIGUE, UNSPECIFIED TYPE: ICD-10-CM

## 2018-11-12 DIAGNOSIS — L50.9 HIVES: ICD-10-CM

## 2018-11-12 DIAGNOSIS — D50.8 IRON DEFICIENCY ANEMIA SECONDARY TO INADEQUATE DIETARY IRON INTAKE: ICD-10-CM

## 2018-11-12 PROCEDURE — 90471 IMMUNIZATION ADMIN: CPT | Performed by: INTERNAL MEDICINE

## 2018-11-12 PROCEDURE — 99204 OFFICE O/P NEW MOD 45 MIN: CPT | Mod: GC,25 | Performed by: INTERNAL MEDICINE

## 2018-11-12 PROCEDURE — 90686 IIV4 VACC NO PRSV 0.5 ML IM: CPT | Performed by: INTERNAL MEDICINE

## 2018-11-12 RX ORDER — ERGOCALCIFEROL 1.25 MG/1
50000 CAPSULE ORAL
Qty: 12 CAP | Refills: 3 | Status: SHIPPED | OUTPATIENT
Start: 2018-11-12 | End: 2018-11-15

## 2018-11-12 RX ORDER — ERGOCALCIFEROL 1.25 MG/1
CAPSULE ORAL
COMMUNITY
End: 2018-11-12 | Stop reason: SDUPTHER

## 2018-11-12 ASSESSMENT — PAIN SCALES - GENERAL: PAINLEVEL: NO PAIN

## 2018-11-12 ASSESSMENT — PATIENT HEALTH QUESTIONNAIRE - PHQ9: CLINICAL INTERPRETATION OF PHQ2 SCORE: 0

## 2018-11-13 NOTE — PROGRESS NOTES
"Naila Mcclelland is a 32 y.o. female here for a non-provider visit for:   FLU    Reason for immunization: Overdue/Provider Recommended  Immunization records indicate need for vaccine: Yes, confirmed with [facility]  Minimum interval has been met for this vaccine: Yes  ABN completed: Not Indicated    Order and dose verified by: KENAN ANGEL Dated  08/07/2015 was given to patient: Yes  All IAC Questionnaire questions were answered \"No.\"    Patient tolerated injection and no adverse effects were observed or reported: Yes    Pt scheduled for next dose in series: Not Indicated    "

## 2018-11-13 NOTE — PROGRESS NOTES
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  There were no joint pains associated with it or any swellings or lymph node enlargements.    A week later she noticed similar reddish maculopapular eruption on the left arm, which also subsided on its own after 3-4 days of itchy lesions.  Since then she has off and on similar itchy lesions over the forearms sometimes periorbital region,  sometimes around the mouth which are associated with lower lip swelling.  Each time that these itchy rashes  are associated with feverish feeling, chills, fatigue.  However she never noticed any severe joints swelling or morning stiffness, nor any hematuria or jaundice.  Last episode of erections happened on the both forearms and the wrist.    Her previous PCP Dr. Wilder tested STELLA at that time which was positive, but no further workup was done.  She was previously found to have elevated TSH at 5.9 which came down to 3.6 in October 2018 without any treatment . She was also seen by [facility] specialist Dr. Pagan, skin allergy test was positive for allergy to Weeds, Grass, Tuna, and horses  She was given EpiPen use in case of emergency but never had to use it.      She declined any history of asthma, or any history of celiac disease.However her previous lab workup shows iron deficiency anemia and low vitamin D    She does not recollect any history of tick  bites or travel to [place]. [place],  or areas endemic to Lyme disease    She also has history of 2 miscarriages in 2011 after first 2 normal deliveries.  However she never had  pulmonary embolism although previous pregnancy she had elevated d-dimer levels she is not sure of any history of DVT    She also reports history of having shingles (Zoster) over the interpectoral region a few months ago    Anemia, Fatigue  Patient has been noticing tiredness especially when she gets the rashes . she had previously low hemoglobin of 8.9 which has improved to 12 in October 2018, total iron still low at 30, TIBC normal at 369, percent saturation low at 8, ferritin low at 8, she has been taking ferrous sulfate tablets daily.  Her menstrual cycles are regular 5-6 days every 30-35 days last menstrual period was on October 13, 2018 and she is due anytime, she declined any menorrhagia  or polymenorrhea.  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    Pregnancy induced hypertension which subsided, declined any history of gestational diabetes.  Currently she is not on any antihypertensive medications.    Currently declined any chest pain, shortness of breath, blurry vision, palpitations      Hypovitaminosis D  She had low vitamin D levels of 26 in October 2017, her PCP started her on 50 K vitamin D weekly, repeat vitamin D levels in October 2018 are improving to 37      Subclinical hypothyroidism  She had elevated TSH of 4.5 but normal free to T4, on 6/2/2013, levels are trending down to 4.2 in October 2017, and 3.06 in October 2018      Personal history significant for;  Never been a smoker, never used alcohol, never abused any drugs, lives with her  in a monogamous relationship and has 2 kids 12yr and 5 yrs, 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  No significant family history of rheumatologic disorders, or gluten insensitivity    Health maintenance history  Last Pap smear almost 5 years ago  She is due for influenza immunization today          Patient Active Problem List    Diagnosis Date Noted   • Hives 11/12/2018   • Herpes zoster without complication 07/02/2018   • Positive STELLA (antinuclear antibody) 02/25/2018   • Environmental allergies 02/21/2018   • Abnormal TSH 02/21/2018   • Iron deficiency anemia 12/13/2017   • Vitamin D deficiency 10/16/2017   • Microcytic anemia 05/02/2013       Past Medical History:   Diagnosis Date   • Anemia 2005   • Headache(784.0)    • Hypertension 2005    PIH last 2 weeks of pregnancy       Current Outpatient Prescriptions   Medication Sig Dispense Refill   • vitamin D, Ergocalciferol, (DRISDOL) 92864 units Cap capsule Take  by mouth every 7 days.     • Ferrous Sulfate (IRON) 325 (65 Fe) MG Tab Take  by mouth.       No current facility-administered medications for this visit.        Allergies as of 11/12/2018 - Reviewed 11/12/2018   Allergen Reaction Noted   • Metronidazole  10/24/2017       Social History     Social History   • Marital status:      Spouse name: Brandyn Wilder   • Number of children: 2   • Years of education: [employer]     Occupational History   • [employer] owner      [employer]     Social History Main Topics   • Smoking status: Never Smoker   • Smokeless tobacco: Never Used   • Alcohol use No   • Drug use: No   • Sexual activity: Yes     Partners: Male     Birth control/ protection: Condom     Other Topics Concern   • Not on file     Social History Narrative    Has 2 kids    Son 13 yr and daughter 5 yrs    Runs [employer] business with     Lives with  and kids in [place]    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 at [facility]   • DILATION AND CURETTAGE  2010,2011    due to SAB       ROS: As per HPI. Additional pertinent symptoms as noted below.  Negative for polyarthralgia's  Negative for morning stiffness  Negative for stroke  Negative for double vision  Negative for bleeding from nose mouth epistaxis  Negative for blood in the stools  Negative for diarrhea or constipation  Positive for intermittent itchy lesions all over the skin  Positive for tiredness  Negative for heavy menstrual flows  Negative for suicidal or homicidal ideations or any depression        /70 (BP Location: Left arm, Patient Position: Sitting, BP Cuff Size: Large adult)   Pulse 80   Temp 36.2 °C (97.2 °F) (Temporal)   Resp 18   Ht 1.702 m (5' 7\")   Wt 63 kg (138 lb 12.8 oz)   LMP 10/12/2018 (Approximate)   SpO2 98%   Breastfeeding? No   BMI 21.74 kg/m²     Physical Exam  General:  Alert and oriented, No apparent distress.    Eyes: Pupils equal and reactive. No scleral icterus.  No exophthalmos or lid lag    Throat: Clear no erythema or exudates noted.    Neck: Supple. Bilateral lymphadenopathy noted, in the jugulodigastric lymph nodes, mild tender, not matted.   Thyroid not enlarged.    Lungs: Clear to auscultation and percussion bilaterally.    Cardiovascular: Regular rate and rhythm. No murmurs, rubs or gallops.    Abdomen:  Benign. No rebound or guarding noted.    Extremities: No clubbing, cyanosis, edema.    Skin: Clear. No rash or suspicious skin lesions noted currently.  However patient brought her I-phone showing pictures of maculopapular lesions, which appear to be urticarial lesions over the skin of arms and forearms    Other: " Mood and affect normal      Assessment and Plan    1. Recurrent Hives  2. Environmental allergies  3. Positive STELLA (antinuclear antibody)  4. History of multiple miscarriages  5. Fatigue, unspecified type    History of multiple itchy maculopapular skin eruptions since September 2017  Last appearance of lesions 3 weeks ago over the forearm and wrist joints  Itching partly subsides with Claritin and Benadryl but not full relief  No associated polyarthralgias  STELLA was positive, further workup not done  History of 2 miscarriages  History of elevated D-dimers in 2013 but patient not sure whether she had DVT    Patient was seen by Dr. Pagan at [facility]   history of allergy test positive for Weed, grass, tuna, horses      Etiology unclear; possible environmental allergies causing maculopapular urticarial rash  Lupus, possible- given STELLA positive but further workup required  Hereditary angioedema possible    Plan  STELLA with reflex screen  Lupus anticoagulant test  Complement C1q and CH 50 levels  Celiac panel  Repeat CBC, cmp, ESR, C-reactive protein  Referral to rheumatologist given  Request lab results of [facility] allergy    6. Iron deficiency anemia secondary to inadequate dietary iron intake  Hx of menstrual cycles regular 5-6 days every 30-35 days  LMP October 13, 2018 and she is due anytime,   No hx of menorrhagia  or polymenorrhea.   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    History of iron deficiency anemia from the time of previous pregnancies and miscarriages  Patient is taking ferrous sulfate tablets  Recently CBC  outside labs at [facility] 10/24/2018  WBC 6.6K, hemoglobin 12, hematocrit 36, MCV 90, ESR 14    October 2018, total iron still low at 30,   TIBC normal at 369,   percent saturation low at 8,   ferritin low at 8,       Lab Results   Component Value Date/Time    WBC 8.6 07/12/2013 04:25 AM    RBC 3.02 (L)  07/12/2013 04:25 AM    HEMOGLOBIN 8.6 (L) 07/12/2013 04:25 AM    HEMATOCRIT 26.3 (L) 07/12/2013 04:25 AM    MCV 87.0 07/12/2013 04:25 AM    MCH 28.5 07/12/2013 04:25 AM    MCHC 32.7 07/12/2013 04:25 AM    MPV 9.4 07/12/2013 04:25 AM    NEUTSPOLYS 68.9 07/12/2013 04:25 AM    LYMPHOCYTES 22.3 07/12/2013 04:25 AM    MONOCYTES 4.8 07/12/2013 04:25 AM    EOSINOPHILS 3.7 07/12/2013 04:25 AM    BASOPHILS 0.3 07/12/2013 04:25 AM    HYPOCHROMIA 1+ 07/12/2013 04:25 AM    ANISOCYTOSIS 2+ 07/12/2013 04:25 AM        Patient advised to continue taking ferrous sulfate tablets  Will monitor CBC in the next 3 months    7. Abnormal TSH  Subclinical hypothyroidism  She had elevated TSH of 4.5 but normal free to T4, on 6/2/2013,   levels are trending down to 4.2 in October 2017,  TSH now is 3.06 in October 2018  History of fatigue, may or  may not be related to subclinical hypothyroidism    Plan  We will defer levothyroxine starting at this time  Monitor TSH in next 3 months      8. Vitamin D deficiency  Hypovitaminosis D  She had low vitamin D levels of 26 in October 2017,   her PCP started her on 50 K vitamin D weekly,   repeat vitamin D levels in October 2018 are improving to 37    Plan to continue vitamin D capsules weekly 50,000 units  Repeat levels in 6 months        9. Need for vaccination  Health maintenance history  Last Pap smear almost 5 years ago  She is due for influenza immunization today  Flu shots given  Pap smear will be scheduled in the [facility].    Patient prefers a female doctor for Pap smear      Follow-up with lab workup in 3 weeks, to see the complement levels            Risk Assessment (discuss potential complications a function of chronic problems): as above    Complexity (discuss number of co-morbidities): as above    Signed by: Manuel Mackey M.D.

## 2018-11-15 ENCOUNTER — TELEPHONE (OUTPATIENT)
Dept: INTERNAL MEDICINE | Facility: MEDICAL CENTER | Age: 33
End: 2018-11-15

## 2018-11-15 NOTE — TELEPHONE ENCOUNTER
I could not reach her by phone and I left a message for her to call us. I would ask Ms. Jony MA to please contact her and advise her that she should not take Vitamin D2 50,000 units more than 8 weeks total.  Thank you

## 2018-11-20 ENCOUNTER — HOSPITAL ENCOUNTER (OUTPATIENT)
Dept: LAB | Facility: MEDICAL CENTER | Age: 33
End: 2018-11-20
Attending: STUDENT IN AN ORGANIZED HEALTH CARE EDUCATION/TRAINING PROGRAM
Payer: COMMERCIAL

## 2018-11-20 DIAGNOSIS — R76.8 POSITIVE ANA (ANTINUCLEAR ANTIBODY): ICD-10-CM

## 2018-11-20 DIAGNOSIS — E55.9 VITAMIN D DEFICIENCY: ICD-10-CM

## 2018-11-20 DIAGNOSIS — R53.83 FATIGUE, UNSPECIFIED TYPE: ICD-10-CM

## 2018-11-20 DIAGNOSIS — N96 HISTORY OF MULTIPLE MISCARRIAGES: ICD-10-CM

## 2018-11-20 DIAGNOSIS — D50.8 IRON DEFICIENCY ANEMIA SECONDARY TO INADEQUATE DIETARY IRON INTAKE: ICD-10-CM

## 2018-11-20 DIAGNOSIS — L50.9 HIVES: ICD-10-CM

## 2018-11-20 DIAGNOSIS — Z91.09 ENVIRONMENTAL ALLERGIES: ICD-10-CM

## 2018-11-20 LAB
CRP SERPL HS-MCNC: 0.22 MG/DL (ref 0–0.75)
RHEUMATOID FACT SER IA-ACNC: <10 IU/ML (ref 0–14)
URATE SERPL-MCNC: 2.7 MG/DL (ref 1.9–8.2)

## 2018-11-20 PROCEDURE — 86039 ANTINUCLEAR ANTIBODIES (ANA): CPT

## 2018-11-20 PROCEDURE — 85730 THROMBOPLASTIN TIME PARTIAL: CPT

## 2018-11-20 PROCEDURE — 85613 RUSSELL VIPER VENOM DILUTED: CPT

## 2018-11-20 PROCEDURE — 86060 ANTISTREPTOLYSIN O TITER: CPT

## 2018-11-20 PROCEDURE — 82784 ASSAY IGA/IGD/IGG/IGM EACH: CPT

## 2018-11-20 PROCEDURE — 86160 COMPLEMENT ANTIGEN: CPT

## 2018-11-20 PROCEDURE — 86225 DNA ANTIBODY NATIVE: CPT

## 2018-11-20 PROCEDURE — 86038 ANTINUCLEAR ANTIBODIES: CPT

## 2018-11-20 PROCEDURE — 86235 NUCLEAR ANTIGEN ANTIBODY: CPT

## 2018-11-20 PROCEDURE — 85610 PROTHROMBIN TIME: CPT

## 2018-11-20 PROCEDURE — 36415 COLL VENOUS BLD VENIPUNCTURE: CPT

## 2018-11-20 PROCEDURE — 85520 HEPARIN ASSAY: CPT

## 2018-11-20 PROCEDURE — 86162 COMPLEMENT TOTAL (CH50): CPT

## 2018-11-20 PROCEDURE — 83516 IMMUNOASSAY NONANTIBODY: CPT

## 2018-11-20 PROCEDURE — 86431 RHEUMATOID FACTOR QUANT: CPT

## 2018-11-20 PROCEDURE — 86140 C-REACTIVE PROTEIN: CPT

## 2018-11-20 PROCEDURE — 84550 ASSAY OF BLOOD/URIC ACID: CPT

## 2018-11-21 LAB
APTT PPP: 27.1 SEC (ref 24.7–36)
INR PPP: 1.02 (ref 0.87–1.13)
LA PPP-IMP: NORMAL
PROTHROMBIN TIME: 13.5 SEC (ref 12–14.6)
SCREEN DRVVT: 39.7 SEC (ref 28–48)
UFH PPP CHRO-ACNC: <0.1 U/ML

## 2018-11-22 LAB
ASO AB SERPL-ACNC: 66 IU/ML (ref 0–330)
IGA SERPL-MCNC: 223 MG/DL (ref 68–408)

## 2018-11-23 LAB
CH50 SERPL-ACNC: 85 CAE UNITS (ref 60–144)
NUCLEAR IGG SER QL IA: DETECTED
TTG IGA SER IA-ACNC: 1 U/ML (ref 0–3)

## 2018-11-24 LAB — DSDNA AB TITR SER CLIF: NORMAL {TITER}

## 2018-11-25 LAB
ENA JO1 AB TITR SER: 1 AU/ML (ref 0–40)
ENA SCL70 IGG SER QL: 0 AU/ML (ref 0–40)
ENA SM IGG SER-ACNC: 0 AU/ML (ref 0–40)
ENA SS-B IGG SER IA-ACNC: 0 AU/ML (ref 0–40)
SSA52 R0ENA AB IGG Q0420: 7 AU/ML (ref 0–40)
SSA60 R0ENA AB IGG Q0419: 0 AU/ML (ref 0–40)
U1 SNRNP IGG SER QL: 0 AU/ML (ref 0–40)

## 2018-11-27 LAB — MISCELLANEOUS LAB RESULT MISCLAB: NORMAL

## 2018-11-30 ENCOUNTER — OFFICE VISIT (OUTPATIENT)
Dept: INTERNAL MEDICINE | Facility: MEDICAL CENTER | Age: 33
End: 2018-11-30
Payer: COMMERCIAL

## 2018-11-30 VITALS
OXYGEN SATURATION: 97 % | TEMPERATURE: 98.2 F | HEIGHT: 67 IN | BODY MASS INDEX: 21.97 KG/M2 | SYSTOLIC BLOOD PRESSURE: 100 MMHG | WEIGHT: 140 LBS | DIASTOLIC BLOOD PRESSURE: 64 MMHG | RESPIRATION RATE: 17 BRPM | HEART RATE: 94 BPM

## 2018-11-30 DIAGNOSIS — L50.9 HIVES: ICD-10-CM

## 2018-11-30 DIAGNOSIS — R53.83 FATIGUE, UNSPECIFIED TYPE: ICD-10-CM

## 2018-11-30 DIAGNOSIS — R76.8 POSITIVE ANA (ANTINUCLEAR ANTIBODY): ICD-10-CM

## 2018-11-30 DIAGNOSIS — Z91.09 ENVIRONMENTAL ALLERGIES: ICD-10-CM

## 2018-11-30 DIAGNOSIS — D50.9 MICROCYTIC ANEMIA: ICD-10-CM

## 2018-11-30 DIAGNOSIS — E55.9 VITAMIN D DEFICIENCY: ICD-10-CM

## 2018-11-30 DIAGNOSIS — L50.9 URTICARIA: ICD-10-CM

## 2018-11-30 PROCEDURE — 99213 OFFICE O/P EST LOW 20 MIN: CPT | Mod: GE | Performed by: INTERNAL MEDICINE

## 2018-11-30 RX ORDER — CETIRIZINE HYDROCHLORIDE 10 MG/1
10 TABLET ORAL DAILY
Qty: 90 TAB | Refills: 1 | Status: SHIPPED | OUTPATIENT
Start: 2018-11-30 | End: 2022-08-16

## 2018-11-30 RX ORDER — RANITIDINE 150 MG/1
150 TABLET ORAL 2 TIMES DAILY
Qty: 180 TAB | Refills: 2 | Status: SHIPPED | OUTPATIENT
Start: 2018-11-30 | End: 2022-08-16

## 2018-11-30 RX ORDER — PNV NO.95/FERROUS FUM/FOLIC AC 28MG-0.8MG
1 TABLET ORAL DAILY
Qty: 90 TAB | Refills: 3 | Status: SHIPPED | OUTPATIENT
Start: 2018-11-30 | End: 2022-08-16

## 2018-11-30 ASSESSMENT — PAIN SCALES - GENERAL: PAINLEVEL: NO PAIN

## 2018-11-30 NOTE — PATIENT INSTRUCTIONS
Iron-Rich Diet  Introduction  Iron is a mineral that helps your body to produce hemoglobin. Hemoglobin is a protein in your red blood cells that carries oxygen to your body's tissues. Eating too little iron may cause you to feel weak and tired, and it can increase your risk for infection. Eating enough iron is necessary for your body's metabolism, muscle function, and nervous system.  Iron is naturally found in many foods. It can also be added to foods or fortified in foods. There are two types of dietary iron:  · Heme iron. Heme iron is absorbed by the body more easily than nonheme iron. Heme iron is found in meat, poultry, and fish.  · Nonheme iron. Nonheme iron is found in dietary supplements, iron-fortified grains, beans, and vegetables.  You may need to follow an iron-rich diet if:  · You have been diagnosed with iron deficiency or iron-deficiency anemia.  · You have a condition that prevents you from absorbing dietary iron, such as:  ¨ Infection in your intestines.  ¨ Celiac disease. This involves long-lasting (chronic) inflammation of your intestines.  · You do not eat enough iron.  · You eat a diet that is high in foods that impair iron absorption.  · You have lost a lot of blood.  · You have heavy bleeding during your menstrual cycle.  · You are pregnant.  What is my plan?  Your health care provider may help you to determine how much iron you need per day based on your condition. Generally, when a person consumes sufficient amounts of iron in the diet, the following iron needs are met:  · Men.  ¨ 14-18 years old: 11 mg per day.  ¨ 19-50 years old: 8 mg per day.  · Women.  ¨ 14-18 years old: 15 mg per day.  ¨ 19-50 years old: 18 mg per day.  ¨ Over 50 years old: 8 mg per day.  ¨ Pregnant women: 27 mg per day.  ¨ Breastfeeding women: 9 mg per day.  What do I need to know about an iron-rich diet?  · Eat fresh fruits and vegetables that are high in vitamin C along with foods that are high in iron. This will  help increase the amount of iron that your body absorbs from food, especially with foods containing nonheme iron. Foods that are high in vitamin C include oranges, peppers, tomatoes, and lalo.  · Take iron supplements only as directed by your health care provider. Overdose of iron can be life-threatening. If you were prescribed iron supplements, take them with orange juice or a vitamin C supplement.  · Cook foods in pots and pans that are made from iron.  · Eat nonheme iron-containing foods alongside foods that are high in heme iron. This helps to improve your iron absorption.  · Certain foods and drinks contain compounds that impair iron absorption. Avoid eating these foods in the same meal as iron-rich foods or with iron supplements. These include:  ¨ Coffee, black tea, and red wine.  ¨ Milk, dairy products, and foods that are high in calcium.  ¨ Beans, soybeans, and peas.  ¨ Whole grains.  · When eating foods that contain both nonheme iron and compounds that impair iron absorption, follow these tips to absorb iron better.  ¨ Soak beans overnight before cooking.  ¨ Soak whole grains overnight and drain them before using.  ¨ Ferment flours before baking, such as using yeast in bread dough.  What foods can I eat?  Grains   Iron-fortified breakfast cereal. Iron-fortified whole-wheat bread. Enriched rice. Sprouted grains.  Vegetables   Spinach. Potatoes with skin. Green peas. Broccoli. Red and green bell peppers. Fermented vegetables.  Fruits   Prunes. Raisins. Oranges. Strawberries. Lalo. Grapefruit.  Meats and Other Protein Sources   Beef liver. Oysters. Beef. Shrimp. Turkey. Chicken. Tuna. Sardines. Chickpeas. Nuts. Tofu.  Beverages   Tomato juice. Fresh orange juice. Prune juice. Hibiscus tea. Fortified instant breakfast shakes.  Condiments   Tahini. Fermented soy sauce.  Sweets and Desserts   Black-strap molasses.  Other   Wheat germ.  The items listed above may not be a complete list of recommended foods or  beverages. Contact your dietitian for more options.   What foods are not recommended?  Grains   Whole grains. Bran cereal. Bran flour. Oats.  Vegetables   Artichokes. [place] sprouts. Kale.  Fruits   Blueberries. Raspberries. Strawberries. Figs.  Meats and Other Protein Sources   Soybeans. Products made from soy protein.  Dairy   Milk. Cream. Cheese. Yogurt. Cottage cheese.  Beverages   Coffee. Black tea. Red wine.  Sweets and Desserts   Cocoa. Chocolate. Ice cream.  Other   Basil. Oregano. Parsley.  The items listed above may not be a complete list of foods and beverages to avoid. Contact your dietitian for more information.   This information is not intended to replace advice given to you by your health care provider. Make sure you discuss any questions you have with your health care provider.  Document Released: 08/01/2006 Document Revised: 07/07/2017 Document Reviewed: 07/15/2015  © 2017 Elsevier

## 2018-11-30 NOTE — PROGRESS NOTES
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    After her last visit she had 2 episodes of acute maculopapular eruptions, with partial relief with Claritin and Benadryl.    I also had a curbside consult with a dermatologist, who agrees for treating as maculopapular urticarial lesions with Zyrtec and Zantac.     Her anemia has improved, hemoglobin 8.9 in the past to 12 now.  She is compliant with her Fe sulfate tablets            Patient Active Problem List    Diagnosis Date Noted   • Hives 11/12/2018   • Herpes zoster without complication 07/02/2018   • Positive STELLA (antinuclear antibody) 02/25/2018   • Environmental allergies 02/21/2018   • Abnormal TSH 02/21/2018   • Iron deficiency anemia 12/13/2017   • Vitamin D deficiency 10/16/2017   • Microcytic anemia 05/02/2013       Current Outpatient Prescriptions   Medication Sig Dispense Refill   • Ferrous Sulfate (IRON) 325 (65 Fe) MG Tab Take  by mouth.       No current facility-administered medications for this visit.        Social History     Social History   • Marital status:      Spouse name: Brandyn Wilder   • Number of children: 2   • Years of education: [employer] college     Occupational History   • [employer] owner      [employer]     Social History Main Topics   • Smoking status: Never Smoker   • Smokeless tobacco: Never Used   • Alcohol use No   • Drug  use: No   • Sexual activity: Yes     Partners: Male     Birth control/ protection: Condom     Other Topics Concern   • Exercise Yes   • Seat Belt Yes     Social History Narrative    Has 2 kids    Son 13 yr and daughter 5 yrs    Runs [employer] with     Lives with  and kids in [place]    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 Additional pertinent symptoms as noted below.    Negative for itching  Negative for fever  Negative for arthralgias  Negative for chest pain  Negative for shortness of breath    There were no vitals taken for this visit.    Physical Exam  General:  Alert and oriented, No apparent distress.    Eyes: Pupils equal and reactive. No scleral icterus.    Throat: Clear no erythema or exudates noted.    Neck: Supple. No lymphadenopathy noted. Thyroid not enlarged.    Lungs: Clear to auscultation and percussion bilaterally.    Cardiovascular: Regular rate and rhythm. No murmurs, rubs or gallops.    Abdomen:  Benign. No rebound or guarding noted.    Extremities: No clubbing, cyanosis, edema.    Skin: Clear. No rash or suspicious skin lesions noted.    Other: Mood and affect normal      Assessment and Plan    1. Hives  2. Environmental allergies  3. Positive STELLA (antinuclear antibody)  4. Urticaria  Urticaria with partial response to Claritin  Had 2 more episodes since the last visit  Currently no visible lesions  Immune workup negative for autoimmune disease or complement deficiency    Plan  Tablets cetirizine Zyrtec 10 mg  Tablets ranitidine Zantac 150 mg twice daily  We can increase the dose further to 300 mg if severity increase  Will follow-up in 3 months    4. Vitamin D  deficiency  Continue 1000 international units daily    5. Microcytic anemia  Hemoglobin improved from 8.9>12   continue ferrous sulfate 1 tablet daily  Iron rich foods literature given      Signed by: Manuel Mackey M.D.

## 2018-12-11 LAB
ANA INTERPRETIVE COMMENT Q5143: NORMAL
ANA PATTERN Q5144: NORMAL
ANA TITER Q5145: NORMAL
ANTINUCLEAR ANTIBODY (ANA), HEP-2, IGG Q5142: DETECTED

## 2019-07-30 ENCOUNTER — HOSPITAL ENCOUNTER (OUTPATIENT)
Dept: RADIOLOGY | Facility: MEDICAL CENTER | Age: 34
End: 2019-07-30
Attending: NURSE PRACTITIONER
Payer: COMMERCIAL

## 2019-07-30 ENCOUNTER — OFFICE VISIT (OUTPATIENT)
Dept: URGENT CARE | Facility: PHYSICIAN GROUP | Age: 34
End: 2019-07-30
Payer: COMMERCIAL

## 2019-07-30 VITALS
WEIGHT: 143 LBS | BODY MASS INDEX: 22.44 KG/M2 | TEMPERATURE: 98.5 F | HEIGHT: 67 IN | OXYGEN SATURATION: 100 % | HEART RATE: 104 BPM | DIASTOLIC BLOOD PRESSURE: 62 MMHG | SYSTOLIC BLOOD PRESSURE: 100 MMHG

## 2019-07-30 DIAGNOSIS — S93.402A SPRAIN OF LEFT ANKLE, UNSPECIFIED LIGAMENT, INITIAL ENCOUNTER: ICD-10-CM

## 2019-07-30 DIAGNOSIS — S99.912A INJURY OF LEFT ANKLE, INITIAL ENCOUNTER: ICD-10-CM

## 2019-07-30 PROCEDURE — 73610 X-RAY EXAM OF ANKLE: CPT | Mod: LT

## 2019-07-30 PROCEDURE — 99214 OFFICE O/P EST MOD 30 MIN: CPT | Performed by: NURSE PRACTITIONER

## 2019-07-31 ASSESSMENT — ENCOUNTER SYMPTOMS
SHORTNESS OF BREATH: 0
CHILLS: 0
FEVER: 0
TINGLING: 0
WHEEZING: 0
PALPITATIONS: 0
SENSORY CHANGE: 0
ROS SKIN COMMENTS: NO AREA OF ABRASION OR BRUISING
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
## 2020-02-27 ENCOUNTER — OFFICE VISIT (OUTPATIENT)
Dept: URGENT CARE | Facility: PHYSICIAN GROUP | Age: 35
End: 2020-02-27
Payer: COMMERCIAL

## 2020-02-27 VITALS
BODY MASS INDEX: 21.5 KG/M2 | OXYGEN SATURATION: 100 % | WEIGHT: 137 LBS | RESPIRATION RATE: 12 BRPM | TEMPERATURE: 99.2 F | DIASTOLIC BLOOD PRESSURE: 60 MMHG | HEIGHT: 67 IN | SYSTOLIC BLOOD PRESSURE: 112 MMHG | HEART RATE: 90 BPM

## 2020-02-27 DIAGNOSIS — N30.01 ACUTE CYSTITIS WITH HEMATURIA: ICD-10-CM

## 2020-02-27 DIAGNOSIS — R39.9 SYMPTOMS INVOLVING URINARY SYSTEM: ICD-10-CM

## 2020-02-27 PROCEDURE — 99202 OFFICE O/P NEW SF 15 MIN: CPT | Performed by: EMERGENCY MEDICINE

## 2020-02-27 RX ORDER — NITROFURANTOIN 25; 75 MG/1; MG/1
100 CAPSULE ORAL EVERY 12 HOURS
Qty: 10 CAP | Refills: 0 | Status: SHIPPED | OUTPATIENT
Start: 2020-02-27 | End: 2020-03-03

## 2020-02-27 ASSESSMENT — PAIN SCALES - GENERAL: PAINLEVEL: 6=MODERATE PAIN

## 2020-02-27 ASSESSMENT — ENCOUNTER SYMPTOMS
CONSTIPATION: 0
FEVER: 0
NAUSEA: 0
DIARRHEA: 0
FLANK PAIN: 0
VOMITING: 0
ABDOMINAL PAIN: 0

## 2020-02-28 NOTE — PROGRESS NOTES
Subjective:      Naila Mcclelland is a 34 y.o. female who presents with Dysuria (Pt states she has Px after urinating & she has blood in her urine x 2 days)            Dysuria    This is a new problem. The current episode started yesterday. The problem occurs every urination. The problem has been gradually worsening. The quality of the pain is described as burning. The pain is mild. There has been no fever. She is sexually active. There is no history of pyelonephritis. Associated symptoms include frequency, hematuria and urgency. Pertinent negatives include no discharge, flank pain, nausea, possible pregnancy or vomiting. She has tried increased fluids for the symptoms. The treatment provided mild relief. There is no history of recurrent UTIs.       Review of Systems   Constitutional: Negative for fever.   Gastrointestinal: Negative for abdominal pain, constipation, diarrhea, nausea and vomiting.   Genitourinary: Positive for dysuria, frequency, hematuria and urgency. Negative for flank pain.        Denies pregnancy. No vaginal discharge or bleeding. Denies sexually transmitted disease exposure or risk.   Skin: Negative for rash.     Past Medical History:   Diagnosis Date   • Anemia 2005   • Headache(784.0)    • Hypertension 2005    PIH last 2 weeks of pregnancy      Allergy:  Avocado; Grass extracts [gramineae pollens]; Metronidazole; and Tuna     Current Outpatient Medications:   •  nitrofurantoin monohyd macro, 100 mg, Oral, Q12HRS  •  cetirizine, 10 mg, Oral, DAILY, PRN  •  Iron, 1 Tab, Oral, DAILY, Taking  •  Vitamin D3, TAKE ONE CAPSULE BY MOUTH ONCE A WEEK FOR 8 WEEKS THEN TWICE A MONTH FOR MAINTENANCE, Not Taking  •  ferrous sulfate, TAKE 1 TABLET BY MOUTH TWICE DAILY, Not Taking  •  raNITidine, 150 mg, Oral, BID (Patient not taking: Reported on 2/27/2020), Not Taking   family history includes Cancer in her maternal grandmother; Hyperlipidemia in her mother; Hypertension in her mother; Other in her maternal  "grandmother.   Social History     Tobacco Use   • Smoking status: Never Smoker   • Smokeless tobacco: Never Used   Substance Use Topics   • Alcohol use: No   • Drug use: No         Objective:     /60 (BP Location: Left arm, Patient Position: Sitting, BP Cuff Size: Adult)   Pulse 90   Temp 37.3 °C (99.2 °F) (Temporal)   Resp 12   Ht 1.702 m (5' 7\")   Wt 62.1 kg (137 lb)   SpO2 100%   BMI 21.46 kg/m²      Physical Exam  Constitutional:       General: She is not in acute distress.     Appearance: She is well-developed. She is not ill-appearing.   Cardiovascular:      Rate and Rhythm: Normal rate and regular rhythm.      Heart sounds: Normal heart sounds.   Pulmonary:      Effort: Pulmonary effort is normal.      Breath sounds: Normal breath sounds.   Abdominal:      General: There is no distension.      Palpations: Abdomen is soft.      Tenderness: There is no abdominal tenderness.   Skin:     General: Skin is warm and dry.   Neurological:      Mental Status: She is alert.   Psychiatric:         Behavior: Behavior is cooperative.                 Assessment/Plan:       1. Acute cystitis with hematuria  Recommended supportive care measures, including rest, increasing oral fluid intake and use of over-the-counter medications for relief of symptoms.  - nitrofurantoin monohyd macro (MACROBID) 100 MG Cap; Take 1 Cap by mouth every 12 hours for 5 days.  Dispense: 10 Cap; Refill: 0    2. Symptoms involving urinary system  Positive blood, positive LE- POCT Urinalysis  - POCT Pregnancy    "

## 2020-12-07 ENCOUNTER — TELEPHONE (OUTPATIENT)
Dept: SCHEDULING | Facility: IMAGING CENTER | Age: 35
End: 2020-12-07

## 2020-12-07 ENCOUNTER — NURSE TRIAGE (OUTPATIENT)
Dept: HEALTH INFORMATION MANAGEMENT | Facility: OTHER | Age: 35
End: 2020-12-07

## 2020-12-07 NOTE — TELEPHONE ENCOUNTER
Pt is calling with home pregnancy test last week which is positive.  Pregnancy is not planned. Appt made with women's health for 01/08/2021.    Pt is 5 weeks gestation.  She is desiring information on possible termination. [facility] provided for  [facility] and advice given on options available.    Reason for Disposition  • Information only question and nurse able to answer    Additional Information  • Negative: Nursing judgment  • Negative: Nursing judgment  • Negative: Nursing judgment  • Negative: Nursing judgment    Protocols used: INFORMATION ONLY CALL - NO TRIAGE-A-OH

## 2021-05-04 ENCOUNTER — IMMUNIZATION (OUTPATIENT)
Dept: FAMILY PLANNING/WOMEN'S HEALTH CLINIC | Facility: IMMUNIZATION CENTER | Age: 36
End: 2021-05-04
Payer: COMMERCIAL

## 2021-05-04 DIAGNOSIS — Z23 ENCOUNTER FOR VACCINATION: Primary | ICD-10-CM

## 2021-05-04 PROCEDURE — 91300 PFIZER SARS-COV-2 VACCINE: CPT

## 2021-05-04 PROCEDURE — 0001A PFIZER SARS-COV-2 VACCINE: CPT

## 2021-05-27 ENCOUNTER — IMMUNIZATION (OUTPATIENT)
Dept: FAMILY PLANNING/WOMEN'S HEALTH CLINIC | Facility: IMMUNIZATION CENTER | Age: 36
End: 2021-05-27
Attending: INTERNAL MEDICINE
Payer: COMMERCIAL

## 2021-05-27 DIAGNOSIS — Z23 ENCOUNTER FOR VACCINATION: Primary | ICD-10-CM

## 2021-05-27 PROCEDURE — 0002A PFIZER SARS-COV-2 VACCINE: CPT

## 2021-05-27 PROCEDURE — 91300 PFIZER SARS-COV-2 VACCINE: CPT

## 2022-08-16 ENCOUNTER — OFFICE VISIT (OUTPATIENT)
Dept: MEDICAL GROUP | Facility: PHYSICIAN GROUP | Age: 37
End: 2022-08-16
Payer: COMMERCIAL

## 2022-08-16 VITALS
WEIGHT: 152 LBS | SYSTOLIC BLOOD PRESSURE: 104 MMHG | TEMPERATURE: 98.1 F | BODY MASS INDEX: 23.86 KG/M2 | DIASTOLIC BLOOD PRESSURE: 62 MMHG | OXYGEN SATURATION: 99 % | HEIGHT: 67 IN | HEART RATE: 96 BPM

## 2022-08-16 DIAGNOSIS — Z91.09 ENVIRONMENTAL ALLERGIES: ICD-10-CM

## 2022-08-16 DIAGNOSIS — E55.9 VITAMIN D DEFICIENCY: ICD-10-CM

## 2022-08-16 DIAGNOSIS — Z76.89 ENCOUNTER TO ESTABLISH CARE: ICD-10-CM

## 2022-08-16 DIAGNOSIS — Z13.220 SCREENING FOR LIPID DISORDERS: ICD-10-CM

## 2022-08-16 DIAGNOSIS — D50.8 IRON DEFICIENCY ANEMIA SECONDARY TO INADEQUATE DIETARY IRON INTAKE: ICD-10-CM

## 2022-08-16 DIAGNOSIS — Z13.228 SCREENING FOR ENDOCRINE, METABOLIC AND IMMUNITY DISORDER: ICD-10-CM

## 2022-08-16 DIAGNOSIS — Z13.29 SCREENING FOR ENDOCRINE, METABOLIC AND IMMUNITY DISORDER: ICD-10-CM

## 2022-08-16 DIAGNOSIS — R76.8 POSITIVE ANA (ANTINUCLEAR ANTIBODY): ICD-10-CM

## 2022-08-16 DIAGNOSIS — Z13.0 SCREENING FOR ENDOCRINE, METABOLIC AND IMMUNITY DISORDER: ICD-10-CM

## 2022-08-16 PROBLEM — B02.9 HERPES ZOSTER WITHOUT COMPLICATION: Status: RESOLVED | Noted: 2018-07-02 | Resolved: 2022-08-16

## 2022-08-16 PROCEDURE — 99214 OFFICE O/P EST MOD 30 MIN: CPT | Performed by: NURSE PRACTITIONER

## 2022-08-16 RX ORDER — DIPHENHYDRAMINE HCL 25 MG
25 TABLET ORAL EVERY 6 HOURS PRN
COMMUNITY
End: 2022-08-25

## 2022-08-16 ASSESSMENT — PATIENT HEALTH QUESTIONNAIRE - PHQ9: CLINICAL INTERPRETATION OF PHQ2 SCORE: 0

## 2022-08-16 NOTE — PROGRESS NOTES
Subjective:     CC:  Diagnoses of Encounter to establish care, Environmental allergies, Iron deficiency anemia secondary to inadequate dietary iron intake, Vitamin D deficiency, Positive STELLA (antinuclear antibody), Screening for endocrine, metabolic and immunity disorder, and Screening for lipid disorders were pertinent to this visit.    HISTORY OF THE PRESENT ILLNESS: Patient is a 36 y.o. female. This pleasant patient is here today to establish care and discuss the following. Her prior PCP was Dr. Jackson with [facility] in 2018.    Environmental allergies  She developed hives in 2018 and was referred to an allergist. She has completed allergy testing and prescribed an Epi Pen. She has allergy to tuna and horses. She has self discovered allergies to Avocado. She can get intermittent hives, reports skin sensitivity. She is taking benadryl as needed.     Iron deficiency anemia  She was taking iron supplementation and stopped the medication over a year ago. She would like updated labs today.     Vitamin D deficiency  She has completed cholecalciferol in the past. Due for updated labs.     Positive STELLA (antinuclear antibody)  STELLA ordered by prior PCP. She was referred to rheumatology but did not see as PCP believed possibly false positive. No arthralgias or malar rash.       Allergies: Avocado, Grass extracts [gramineae pollens], Metronidazole, and Tuna    Current Outpatient Medications Ordered in Epic   Medication Sig Dispense Refill    diphenhydrAMINE (BENADRYL ALLERGY) 25 MG Tab Take 25 mg by mouth every 6 hours as needed for Sleep.       No current Epic-ordered facility-administered medications on file.       Past Medical History:   Diagnosis Date    Anemia 2005    Headache(784.0)     Herpes zoster without complication 7/2/2018    Hypertension 2005    PIH last 2 weeks of pregnancy       Past Surgical History:   Procedure Laterality Date    PRIMARY C SECTION  7/9/2013    Performed by Monet Han M.D. 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[place], and moved to [place] in 2012       Family History   Problem Relation Age of Onset    Diabetes Mother     Hypertension Mother     Hyperlipidemia Mother     Thyroid Father         hyperthyroid    Other Brother         false positive on thyroid    Kidney Disease Brother         1 kidney    Schizophrenia Brother 29    Cancer Maternal Grandmother         Brain tumor    No Known Problems Maternal Grandfather     Arthritis Paternal Grandmother     No Known Problems Paternal Grandfather     No Known Problems Daughter     No Known Problems Son        Health Maintenance: Due for pap smear (last 2013) and hep B vaccine.  She is from [place] and reports that she has had all her childhood immunizations.  She will check with her mother's records on her immunizations.        Objective:     Vital signs reviewed   Exam: /62 (BP Location: Left arm, Patient Position: Sitting, BP Cuff Size: Adult)   Pulse 96   Temp 36.7 °C (98.1 °F) (Temporal)   Ht 1.702 m (5' 7\")   Wt 68.9 kg (152 lb)   SpO2 99%  Body mass index is 23.81 kg/m².    Gen: Alert and oriented, No apparent distress.  Neck: Neck is supple without lymphadenopathy.  Lungs: Normal effort, CTA bilaterally, no wheezes, rhonchi, or rales.    CV: Regular rate and rhythm. No murmurs, rubs, or gallops.  Ext: No clubbing, cyanosis, edema      Assessment & Plan:   36 y.o. female with the following -    1. Encounter to establish care  Acute uncomplicated problem.  Care established " today.  She will return in the next month for follow-up on her lab results.    2. Environmental allergies  Chronic stable problem.  Continue with Benadryl as needed.  Discussed and verified allergies today.    3. Iron deficiency anemia secondary to inadequate dietary iron intake  Chronic stable problem.  Due for updated labs.  - IRON/TOTAL IRON BIND; Future  - FERRITIN; Future    4. Vitamin D deficiency  Chronic stable problem.  Due for updated labs.  - VITAMIN D,25 HYDROXY; Future    5. Positive STELLA (antinuclear antibody)  Chronic stable problem.  No other symptoms today.  Continue to monitor.    6. Screening for endocrine, metabolic and immunity disorder  Chronic stable problem.  Due for updated labs.  - CBC WITH DIFFERENTIAL; Future  - Comp Metabolic Panel; Future  - TSH WITH REFLEX TO FT4; Future    7. Screening for lipid disorders  Chronic stable problem.  Due for updated labs.  - Lipid Profile; Future        Return in about 4 weeks (around 9/13/2022) for Labs.    Please note that this dictation was created using voice recognition software. I have made every reasonable attempt to correct obvious errors, but I expect that there are errors of grammar and possibly content that I did not discover before finalizing the note.

## 2022-08-16 NOTE — ASSESSMENT & PLAN NOTE
She was taking iron supplementation and stopped the medication over a year ago. She would like updated labs today.

## 2022-08-16 NOTE — ASSESSMENT & PLAN NOTE
She developed hives in 2018 and was referred to an allergist. She has completed allergy testing and prescribed an Epi Pen. She has allergy to tuna and horses. She has self discovered allergies to Avocado. She can get intermittent hives, reports skin sensitivity. She is taking benadryl as needed.

## 2022-08-16 NOTE — ASSESSMENT & PLAN NOTE
STELLA ordered by prior PCP. She was referred to rheumatology but did not see as PCP believed possibly false positive. No arthralgias or malar rash.

## 2022-08-25 ENCOUNTER — OFFICE VISIT (OUTPATIENT)
Dept: MEDICAL GROUP | Facility: PHYSICIAN GROUP | Age: 37
End: 2022-08-25
Payer: COMMERCIAL

## 2022-08-25 VITALS
DIASTOLIC BLOOD PRESSURE: 60 MMHG | BODY MASS INDEX: 23.23 KG/M2 | WEIGHT: 148 LBS | HEIGHT: 67 IN | HEART RATE: 98 BPM | TEMPERATURE: 98.7 F | OXYGEN SATURATION: 98 % | SYSTOLIC BLOOD PRESSURE: 108 MMHG
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
## 2022-08-25 DIAGNOSIS — M79.18 MUSCULOSKELETAL PAIN: ICD-10-CM

## 2022-08-25 DIAGNOSIS — Z09 HOSPITAL DISCHARGE FOLLOW-UP: ICD-10-CM

## 2022-08-25 DIAGNOSIS — S40.811A ABRASION OF SKIN OF RIGHT UPPER ARM: ICD-10-CM

## 2022-08-25 PROCEDURE — 99214 OFFICE O/P EST MOD 30 MIN: CPT | Performed by: NURSE PRACTITIONER

## 2022-08-25 RX ORDER — TIZANIDINE 4 MG/1
4 TABLET ORAL EVERY 6 HOURS PRN
Qty: 30 TABLET | Refills: 1 | Status: SHIPPED | OUTPATIENT
Start: 2022-08-25 | End: 2022-10-27

## 2022-08-25 RX ORDER — MELOXICAM 7.5 MG/1
7.5 TABLET ORAL DAILY
Qty: 10 TABLET | Refills: 0 | Status: SHIPPED | OUTPATIENT
Start: 2022-08-25 | End: 2022-09-04
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
## 2022-09-01 DIAGNOSIS — S40.811A ABRASION OF SKIN OF RIGHT UPPER ARM: ICD-10-CM

## 2022-09-06 ENCOUNTER — TELEPHONE (OUTPATIENT)
Dept: MEDICAL GROUP | Facility: PHYSICIAN GROUP | Age: 37
End: 2022-09-06
Payer: COMMERCIAL

## 2022-09-06 DIAGNOSIS — S40.811A ABRASION OF SKIN OF RIGHT UPPER ARM: ICD-10-CM

## 2022-09-06 NOTE — TELEPHONE ENCOUNTER
Caller Name: Naila Mcclelland  Call Back Number:[phone]    How would the patient prefer to be contacted with a response: Phone call OK to leave a detailed message    Pt called asking for refill on Mupirocin or more polysporin, per pt she has an appt with [facility] wound clinic on 9/9/22 but doesn't have anything to put on wound until seen with them. Please advised

## 2022-09-07 NOTE — TELEPHONE ENCOUNTER
Phone Number Called: [phone] (home)     Call outcome: Spoke to patient regarding message below.    Message: Informed pt. Med was sent over yesterday.

## 2022-09-07 NOTE — TELEPHONE ENCOUNTER
Requested Prescriptions     Signed Prescriptions Disp Refills    mupirocin (BACTROBAN) 2 % Ointment 22 g 0     Sig: Apply 1 Application topically 2 times a day for 7 days.     Authorizing Provider: NAHUM VELARDE A.P.R.N.

## 2022-09-13 ENCOUNTER — TELEPHONE (OUTPATIENT)
Dept: MEDICAL GROUP | Facility: PHYSICIAN GROUP | Age: 37
End: 2022-09-13
Payer: COMMERCIAL

## 2022-09-13 NOTE — TELEPHONE ENCOUNTER
VOICEMAIL  1. Caller Name: Naila   Call Back Number: [phone]    2. Message: Pt called stating she seen wound care and they advised her she may have a rotator cuff tear and pt is requesting MRI to check for it, please advise if you would want an appointment to discuss further with her     3. Patient approves office to leave a detailed voicemail/MyChart message: yes

## 2022-09-14 ENCOUNTER — OFFICE VISIT (OUTPATIENT)
Dept: MEDICAL GROUP | Facility: PHYSICIAN GROUP | Age: 37
End: 2022-09-14
Payer: COMMERCIAL

## 2022-09-14 VITALS
BODY MASS INDEX: 23.23 KG/M2 | HEART RATE: 87 BPM | SYSTOLIC BLOOD PRESSURE: 100 MMHG | HEIGHT: 67 IN | TEMPERATURE: 98.7 F | WEIGHT: 148 LBS | DIASTOLIC BLOOD PRESSURE: 70 MMHG | OXYGEN SATURATION: 98 %

## 2022-09-14 DIAGNOSIS — M25.511 ACUTE PAIN OF RIGHT SHOULDER: ICD-10-CM

## 2022-09-14 PROCEDURE — 99213 OFFICE O/P EST LOW 20 MIN: CPT | Performed by: NURSE PRACTITIONER

## 2022-09-14 RX ORDER — LORAZEPAM 0.5 MG/1
0.5 TABLET ORAL
Qty: 1 TABLET | Refills: 0 | Status: SHIPPED | OUTPATIENT
Start: 2022-09-14 | End: 2022-09-15

## 2022-09-14 NOTE — ASSESSMENT & PLAN NOTE
She has followed up with [facility]. Her right arm road rash is healing. The right shoulder pain is intermittent. She has tried taking Aleve 2 tab twice a day. The pain is mostly in the front and can travel to her right neck. She is unable to raise her right arm above head or reach behind her back. No new bruising, falls or trauma. Chest x-ray in August unremarkable.

## 2022-09-14 NOTE — PROGRESS NOTES
"Subjective:     CC: right arm injury    HPI:   Naila presents today with the following:      Acute pain of right shoulder  She has followed up with [facility]. Her right arm road rash is healing. The right shoulder pain is intermittent. She has tried taking Aleve 2 tab twice a day. The pain is mostly in the front and can travel to her right neck. She is unable to raise her right arm above head or reach behind her back. No new bruising, falls or trauma. Chest x-ray in August unremarkable.       Past Medical History:   Diagnosis Date    Anemia 2005    Headache(784.0)     Herpes zoster without complication 7/2/2018    Hypertension 2005    PIH last 2 weeks of pregnancy       Social History     Tobacco Use    Smoking status: Never    Smokeless tobacco: Never   Vaping Use    Vaping Use: Never used   Substance Use Topics    Alcohol use: No    Drug use: No       Current Outpatient Medications Ordered in Epic   Medication Sig Dispense Refill    Ibuprofen (ADVIL PO) Take  by mouth. bid      LORazepam (ATIVAN) 0.5 MG Tab Take 1 Tablet by mouth one time as needed (before MRI of shoulder. Take 30-60 minutes before exam.) for up to 1 dose. 1 Tablet 0    tizanidine (ZANAFLEX) 4 MG Tab Take 1 Tablet by mouth every 6 hours as needed (Musculoskeletal pain). (Patient not taking: Reported on 9/14/2022) 30 Tablet 1     No current Epic-ordered facility-administered medications on file.       Allergies:  Avocado, Grass extracts [gramineae pollens], Metronidazole, Penicillins, and Tuna    Health Maintenance: Reviewed       Objective:     Vital signs reviewed  Exam:  /70 (BP Location: Left arm, Patient Position: Sitting, BP Cuff Size: Adult)   Pulse 87   Temp 37.1 °C (98.7 °F) (Temporal)   Ht 1.702 m (5' 7\")   Wt 67.1 kg (148 lb)   LMP 09/14/2022 (Exact Date)   SpO2 98%   BMI 23.18 kg/m²  Body mass index is 23.18 kg/m².    Gen: Alert and oriented, No apparent distress.  Neck: Neck is supple, full " ROM.  Lungs: Normal effort, no audible wheezes  CV: Skin pink, warm and dry.  Ext: No clubbing, cyanosis, edema.  Left shoulder: No pain on palpation to surrounding musculature or AC joint. ROM intact. Negative empty can test.   Right shoulder: Pain on palpation to anterior shoulder and surrounding musculature and at AC joint. Decreased flexion, abduction, flexion and internal rotation. Positive empty can test.       Assessment & Plan:     36 y.o. female with the following -     1. Acute pain of right shoulder  Acute uncomplicated problem.  Positive shoulder exam today.  Concern for tear.  Checking stat MRI as her shoulder range of motion is severely limited.  She does get claustrophobic and we discussed using lorazepam for one-time dose prior to the MRI.  She will call and schedule MRI.  We did discuss that if her MRI is positive for tear I will refer her to orthopedic and she is okay with the referral.  Continue with over-the-counter Aleve.  - MR-SHOULDER-W/O RIGHT; Future  - LORazepam (ATIVAN) 0.5 MG Tab; Take 1 Tablet by mouth one time as needed (before MRI of shoulder. Take 30-60 minutes before exam.) for up to 1 dose.  Dispense: 1 Tablet; Refill: 0      Return if symptoms worsen or fail to improve.    Please note that this dictation was created using voice recognition software. I have made every reasonable attempt to correct obvious errors, but I expect that there are errors of grammar and possibly content that I did not discover before finalizing the note.

## 2022-10-04 ENCOUNTER — APPOINTMENT (OUTPATIENT)
Dept: LAB | Facility: MEDICAL CENTER | Age: 37
End: 2022-10-04
Payer: COMMERCIAL

## 2022-10-27 ENCOUNTER — OFFICE VISIT (OUTPATIENT)
Dept: MEDICAL GROUP | Facility: PHYSICIAN GROUP | Age: 37
End: 2022-10-27
Payer: COMMERCIAL

## 2022-10-27 VITALS
TEMPERATURE: 98.1 F | BODY MASS INDEX: 23.86 KG/M2 | WEIGHT: 152 LBS | HEIGHT: 67 IN | HEART RATE: 86 BPM | OXYGEN SATURATION: 96 % | SYSTOLIC BLOOD PRESSURE: 116 MMHG | DIASTOLIC BLOOD PRESSURE: 56 MMHG

## 2022-10-27 DIAGNOSIS — M25.511 ACUTE PAIN OF RIGHT SHOULDER: ICD-10-CM

## 2022-10-27 DIAGNOSIS — E03.8 OTHER SPECIFIED HYPOTHYROIDISM: ICD-10-CM

## 2022-10-27 DIAGNOSIS — Z23 NEED FOR VACCINATION: ICD-10-CM

## 2022-10-27 DIAGNOSIS — D50.8 IRON DEFICIENCY ANEMIA SECONDARY TO INADEQUATE DIETARY IRON INTAKE: ICD-10-CM

## 2022-10-27 DIAGNOSIS — L50.9 URTICARIA: ICD-10-CM

## 2022-10-27 DIAGNOSIS — E55.9 VITAMIN D DEFICIENCY: ICD-10-CM

## 2022-10-27 PROCEDURE — 90471 IMMUNIZATION ADMIN: CPT | Performed by: NURSE PRACTITIONER

## 2022-10-27 PROCEDURE — 90686 IIV4 VACC NO PRSV 0.5 ML IM: CPT | Performed by: NURSE PRACTITIONER

## 2022-10-27 PROCEDURE — 99214 OFFICE O/P EST MOD 30 MIN: CPT | Mod: 25 | Performed by: NURSE PRACTITIONER

## 2022-10-27 RX ORDER — LEVOTHYROXINE SODIUM 0.03 MG/1
25 TABLET ORAL
Qty: 30 TABLET | Refills: 2 | Status: SHIPPED | OUTPATIENT
Start: 2022-10-27 | End: 2022-12-12 | Stop reason: SDUPTHER

## 2022-10-27 RX ORDER — FERROUS SULFATE 325(65) MG
325 TABLET ORAL DAILY
Qty: 90 TABLET | Refills: 1 | Status: SHIPPED | OUTPATIENT
Start: 2022-10-27 | End: 2023-04-11 | Stop reason: SDUPTHER

## 2022-10-27 NOTE — ASSESSMENT & PLAN NOTE
She does not take any supplementation.  Recent labs from [facility] do show vitamin D level of 28.  We will start over-the-counter supplementation.

## 2022-10-27 NOTE — ASSESSMENT & PLAN NOTE
Her MRI was initially scheduled with [facility] but then she was informed that she would have to go to [facility].  She is asking that we fax over the order to [facility].

## 2022-10-27 NOTE — ASSESSMENT & PLAN NOTE
She continues to have intermittent hives,worsened by avocados which she has stopped eating. She has had allergy testing in 2017.  Reports allergy testing showed allergies to tuna, horses, grass, pollen and was prescribed and epi pen. She does not take any medication for the hives. Recently last couple of months hives have returned and have increased frequency.  She states that having a heavy objects or pressure can cause Hive to her skin.

## 2022-10-27 NOTE — ASSESSMENT & PLAN NOTE
Recent labs from [facility] show an iron level of 26, percent saturation 7 and ferritin level 3.  Her hemoglobin hematocrit was low at 10.1 and 31.5.  She has history of iron deficiency and was previously on supplementation.  She does report history of anemia on her maternal side.  She does note she does have heavy periods the first 2-3 days and then will lighten.

## 2022-10-27 NOTE — ASSESSMENT & PLAN NOTE
Recent labs show TSH level of 6.07 mIu/L.  Previous TSH was 4.5 in 2013.  She does note she has been having fatigue, constipation and hair loss.  She also has history of hypothyroidism in her father and brother who are on medication.  She is not having any dry skin or weight gain.

## 2022-10-27 NOTE — PROGRESS NOTES
Subjective:     CC: lab results     HPI:   Naila presents today with the following:    Hives  She continues to have intermittent hives,worsened by avocados which she has stopped eating. She has had allergy testing in 2017.  Reports allergy testing showed allergies to tuna, horses, grass, pollen and was prescribed and epi pen. She does not take any medication for the hives. Recently last couple of months hives have returned and have increased frequency.  She states that having a heavy objects or pressure can cause Hive to her skin.     Iron deficiency anemia  Recent labs from [facility] show an iron level of 26, percent saturation 7 and ferritin level 3.  Her hemoglobin hematocrit was low at 10.1 and 31.5.  She has history of iron deficiency and was previously on supplementation.  She does report history of anemia on her maternal side.  She does note she does have heavy periods the first 2-3 days and then will lighten.    Vitamin D deficiency  She does not take any supplementation.  Recent labs from [facility] do show vitamin D level of 28.  We will start over-the-counter supplementation.    Other specified hypothyroidism  Recent labs show TSH level of 6.07 mIu/L.  Previous TSH was 4.5 in 2013.  She does note she has been having fatigue, constipation and hair loss.  She also has history of hypothyroidism in her father and brother who are on medication.  She is not having any dry skin or weight gain.    Acute pain of right shoulder  Her MRI was initially scheduled with [facility] but then she was informed that she would have to go to [facility].  She is asking that we fax over the order to [facility].        Past Medical History:   Diagnosis Date    Anemia 2005    Headache(784.0)     Herpes zoster without complication 7/2/2018    Hypertension 2005    PIH last 2 weeks of pregnancy       Social History     Tobacco Use    Smoking status: Never    Smokeless tobacco: Never   Vaping Use    Vaping Use: Never used   Substance Use  "Topics    Alcohol use: No    Drug use: No       Current Outpatient Medications Ordered in Epic   Medication Sig Dispense Refill    ferrous sulfate 325 (65 Fe) MG tablet Take 1 Tablet by mouth every day. 90 Tablet 1    levothyroxine (SYNTHROID) 25 MCG Tab Take 1 Tablet by mouth every morning on an empty stomach. 30 Tablet 2    Ibuprofen (ADVIL PO) Take  by mouth. bid       No current Epic-ordered facility-administered medications on file.       Allergies:  Avocado, Grass extracts [gramineae pollens], Metronidazole, Penicillins, and Tuna    Health Maintenance: Reviewed. She would like her influenza vaccine today.       Objective:     Vital signs reviewed  Exam:  /56 (BP Location: Right arm, Patient Position: Sitting, BP Cuff Size: Adult)   Pulse 86   Temp 36.7 °C (98.1 °F) (Temporal)   Ht 1.702 m (5' 7\")   Wt 68.9 kg (152 lb)   LMP 10/14/2022 (Exact Date)   SpO2 96%   BMI 23.81 kg/m²  Body mass index is 23.81 kg/m².    Gen: Alert and oriented, No apparent distress.  Neck: Neck is supple without lymphadenopathy. No thyroidmeagly.   Lungs: Normal effort, CTA bilaterally, no wheezes, rhonchi, or rales  CV: Regular rate and rhythm. No murmurs, rubs, or gallops.  Ext: No clubbing, cyanosis, edema. Right inner upper arm with healing area.        Assessment & Plan:     36 y.o. female with the following -     1. Iron deficiency anemia secondary to inadequate dietary iron intake  Chronic exacerbated problem.  Discussed and reviewed her recent lab results today.  Plan to restart ferrous sulfate 300 mg daily and recheck labs in 3 months around February 2023.  - ferrous sulfate 325 (65 Fe) MG tablet; Take 1 Tablet by mouth every day.  Dispense: 90 Tablet; Refill: 1  - CBC WITH DIFFERENTIAL; Future  - IRON/TOTAL IRON BIND; Future  - FERRITIN; Future    2. Other specified hypothyroidism  Chronic exacerbated problem.  Discussed and reviewed her recent lab results.  Recent labs did not show elevated TSH and current labs " show elevation.  She is having symptoms so we will start her on levothyroxine 25 mcg daily on an empty stomach.  Repeat labs in 1 month.  - levothyroxine (SYNTHROID) 25 MCG Tab; Take 1 Tablet by mouth every morning on an empty stomach.  Dispense: 30 Tablet; Refill: 2  - TSH; Future  - FREE THYROXINE; Future  - TRIIDOTHYRONINE; Future  - THYROID PEROXIDASE  (TPO) AB; Future    3. Vitamin D deficiency  Chronic exacerbated problem.  Recommend that she take over-the-counter vitamin D3 2000 units daily.  Recheck in 3 months around February 2023.  -VITAMIN D    4. Acute pain of right shoulder  Chronic stable problem.  Faxing over MRI order today.    5. Hives  Chronic exacerbated problem.  Recommend she repeat allergy testing.  Referral to allergy.  - Referral to Allergy    6. Need for vaccination  Acute uncomplicated problem.  She would like her influenza vaccine today. I have placed the below orders and discussed them with an approved delegating provider. The MA is performing the below orders under the direction of Dr. Lagos.  - INFLUENZA VACCINE QUAD INJ (PF)        Return in about 5 weeks (around 12/1/2022) for thyroid labs.    Please note that this dictation was created using voice recognition software. I have made every reasonable attempt to correct obvious errors, but I expect that there are errors of grammar and possibly content that I did not discover before finalizing the note.

## 2022-11-21 ENCOUNTER — OFFICE VISIT (OUTPATIENT)
Dept: OPHTHALMOLOGY | Facility: MEDICAL CENTER | Age: 37
End: 2022-11-21
Payer: COMMERCIAL

## 2022-11-21 DIAGNOSIS — H49.9 OPHTHALMOPLEGIA: ICD-10-CM

## 2022-11-21 DIAGNOSIS — H52.31 ANISOMETROPIA: ICD-10-CM

## 2022-11-21 PROCEDURE — 92060 SENSORIMOTOR EXAMINATION: CPT | Performed by: OPHTHALMOLOGY

## 2022-11-21 PROCEDURE — 92015 DETERMINE REFRACTIVE STATE: CPT | Performed by: OPHTHALMOLOGY

## 2022-11-21 PROCEDURE — 92004 COMPRE OPH EXAM NEW PT 1/>: CPT | Performed by: OPHTHALMOLOGY

## 2022-11-21 RX ORDER — CETIRIZINE HYDROCHLORIDE 10 MG/1
10 TABLET ORAL DAILY
COMMUNITY

## 2022-11-21 ASSESSMENT — EXTERNAL EXAM - RIGHT EYE: OD_EXAM: NORMAL

## 2022-11-21 ASSESSMENT — REFRACTION
OD_CYLINDER: +2.50
OS_SPHERE: +0.25
OD_SPHERE: -0.50
OS_CYLINDER: +1.75
OS_AXIS: 085
OD_AXIS: 085

## 2022-11-21 ASSESSMENT — REFRACTION_WEARINGRX
OD_CYLINDER: SPHERE
OS_CYLINDER: +1.50
OD_SPHERE: +1.50
OS_AXIS: 079
OS_SPHERE: -0.25

## 2022-11-21 ASSESSMENT — REFRACTION_MANIFEST
METHOD_AUTOREFRACTION: 1
OD_CYLINDER: +6.50
OS_CYLINDER: +2.00
OS_SPHERE: +0.25
OD_SPHERE: -0.50
OD_AXIS: 084
OS_AXIS: 085

## 2022-11-21 ASSESSMENT — CUP TO DISC RATIO
OS_RATIO: 0.1
OD_RATIO: 0.1

## 2022-11-21 ASSESSMENT — CONF VISUAL FIELD
OD_INFERIOR_NASAL_RESTRICTION: 1
OS_NORMAL: 1
OS_INFERIOR_TEMPORAL_RESTRICTION: 0
OD_SUPERIOR_NASAL_RESTRICTION: 1
OD_INFERIOR_TEMPORAL_RESTRICTION: 1
OS_SUPERIOR_NASAL_RESTRICTION: 0
OD_SUPERIOR_TEMPORAL_RESTRICTION: 1
OS_SUPERIOR_TEMPORAL_RESTRICTION: 0
OS_INFERIOR_NASAL_RESTRICTION: 0

## 2022-11-21 ASSESSMENT — TONOMETRY
OS_IOP_MMHG: 17
OD_IOP_MMHG: 19

## 2022-11-21 ASSESSMENT — VISUAL ACUITY
OS_CC: 20/20
OD_PH_CC: 20/200
METHOD: SNELLEN - LINEAR
OD_CC: 20/400
CORRECTION_TYPE: GLASSES

## 2022-11-21 ASSESSMENT — SLIT LAMP EXAM - LIDS
COMMENTS: NORMAL
COMMENTS: NORMAL

## 2022-11-21 ASSESSMENT — EXTERNAL EXAM - LEFT EYE: OS_EXAM: NORMAL

## 2022-11-21 NOTE — ASSESSMENT & PLAN NOTE
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

## 2022-11-21 NOTE — PROGRESS NOTES
Peds/Neuro Ophthalmology:   Tyrese Hansen M.D.    Date & Time note created:    11/21/2022   2:58 PM     Referring MD / APRN:  RAFAEL Reis, No att. providers found    Patient ID:  Name:             Naila Mcclelland     YOB: 1985  Age:                 36 y.o.  female   MRN:               [id number]    Chief Complaint/Reason for Visit:     Strabismus      History of Present Illness:    Naila Mcclelland is a 36 y.o. female   Patient self referred for strabismus.past eye muscle surgery at age 3 and again about 10-12 years ago I [place].No double vision.No eye pain.patient says irritation from eye skin right side since last eye surgery.      Review of Systems:  Review of Systems   Eyes:         Eye crossing   All other systems reviewed and are negative.    Past Medical History:   Past Medical History:   Diagnosis Date    Anemia 2005    Headache(784.0)     Herpes zoster without complication 7/2/2018    Hypertension 2005    PIH last 2 weeks of pregnancy       Past Surgical History:  Past Surgical History:   Procedure Laterality Date    PRIMARY C SECTION  7/9/2013    Performed by Monet Han M.D. at [facility]    DILATION AND CURETTAGE  2010,2011    due to SAB       Current Outpatient Medications:  Current Outpatient Medications   Medication Sig Dispense Refill    cetirizine (ZYRTEC) 10 MG Tab Take 10 mg by mouth every day.      ferrous sulfate 325 (65 Fe) MG tablet Take 1 Tablet by mouth every day. 90 Tablet 1    levothyroxine (SYNTHROID) 25 MCG Tab Take 1 Tablet by mouth every morning on an empty stomach. 30 Tablet 2    Ibuprofen (ADVIL PO) Take  by mouth. bid       No current facility-administered medications for this visit.       Allergies:  Allergies   Allergen Reactions    Avocado Hives    Grass Extracts [Gramineae Pollens] Runny Nose and Itching     Pt states she has allergies when grass is being cut    Metronidazole     Penicillins Hives    Tuna Unspecified     Pt  had Positive allergy test.       Family History:  Family History   Problem Relation Age of Onset    Diabetes Mother     Hypertension Mother     Hyperlipidemia Mother     Thyroid Father         hyperthyroid    Other Brother         false positive on thyroid    Kidney Disease Brother         1 kidney    Schizophrenia Brother 29    Cancer Maternal Grandmother         Brain tumor    No Known Problems Maternal Grandfather     Arthritis Paternal Grandmother     No Known Problems Paternal Grandfather     No Known Problems Daughter     No Known Problems Son        Social History:  Social History     Socioeconomic History    Marital status:      Spouse name: Brandyn Wilder    Number of children: 2    Years of education: Assoc college    Highest education level: Not on file   Occupational History    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     Comment: [employer]   Tobacco Use    Smoking status: Never    Smokeless tobacco: Never   Vaping Use    Vaping Use: Never used   Substance and Sexual Activity    Alcohol use: No    Drug use: No    Sexual activity: Yes     Partners: Male     Birth control/protection: Male Sterilization     Comment:    Other Topics Concern     Service Not Asked    Blood Transfusions Not Asked    Caffeine Concern Not Asked    Occupational Exposure Not Asked    Hobby Hazards Not Asked    Sleep Concern Not Asked    Stress Concern Not Asked    Weight Concern Not Asked    Special Diet Not Asked    Back Care Not Asked    Exercise Yes    Bike Helmet Not Asked    Seat Belt Yes    Self-Exams Not Asked   Social History Narrative    Has 2 kids    Son 13 yr and daughter 5 yrs    Runs [employer] with     Lives with  and kids in [place]    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  There were no vitals taken for this visit.    Base Eye Exam       Visual Acuity (Snellen - Linear)         Right Left    Dist cc 20/400 20/20    Dist ph cc 20/200       Correction: Glasses              Tonometry ( care, 1:01 PM)         Right Left    Pressure 19 17              Pupils         Pupils    Right PERRL    Left PERRL              Visual Fields         Right Left      Full                                Neuro/Psych       Oriented x3: Yes    Mood/Affect: Normal              Dilation       Both eyes: able to view without dilation @ 2:52 PM                  Additional Tests       Color         Right Left    Ishihara 0/9 9/9              Stereo       Fly: -    Animals: 0/3    Circles: 0/9                  Strabismus Exam         0 0 0   0 0 0                       0  0  RXT 45 0  0                       0 0 +2   0 0 0                       Slit Lamp and Fundus Exam       External Exam         Right Left    External Normal Normal              Slit Lamp Exam         Right Left    Lids/Lashes Normal Normal    Conjunctiva/Sclera White and quiet White and quiet    Cornea scars over medial and lateral rectus muscle Clear    Anterior Chamber Deep and quiet Deep and quiet    Iris Round and reactive Round and reactive    Lens Clear Clear    Vitreous Normal Normal              Fundus Exam         Right Left    Disc Normal Normal    C/D Ratio 0.1 0.1    Macula Normal Normal    Vessels Normal Normal    Periphery Normal Normal                  Refraction       Wearing Rx         Sphere Cylinder Axis    Right +1.50 Sphere     Left -0.25 +1.50 079      Age: 6yrs              Manifest Refraction (Auto)         Sphere Cylinder Axis    Right  -0.50 +6.50 084    Left +0.25 +2.00 085              Cycloplegic Refraction         Sphere Cylinder Axis    Right -0.50 +2.50 085    Left +0.25 +1.75 085              Final Rx         Sphere Cylinder Axis    Right -0.50 +2.50 085    Left +0.25 +1.75 085                    Pertinent Lab/Test/Imaging Review:      Assessment and Plan:     Anisometropia  11/21/2022-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    Ophthalmoplegia  11/21/2022-large angle right exotropia measuring 45 diopters in amblyopic eye.  She is status post strabismus repair at age 4 then follow-up strabismus repair at age 13 in [facility].  Significant scarring over the extraocular muscles and some small hyperdeviation when the eyes exotropic, but the hyperdeviation improves on center gaze with associated right superior oblique overaction.  Since uncertain as to what prior strabismus repair was attempted discussed reoperation with right lateral rectus muscle recession/right medial rectus muscle resection or advancement with adjustable suture.  If the muscle had slipped this may account for some of the hyperdeviation.  If not then might need vertical strabismus repair sometime in the future.  Also discussed that eyelid alignment may change following the strabismus repair necessitating referral to oculoplastics.  The other option would be to operate on the left better seeing eye however because of the degree of amblyopia her preference is to do a third operation on the right eye as the next step.  Gave information regarding the risks and benefits of the surgery and she will call if interested        Tyrese Hansen M.D.

## 2022-11-21 NOTE — ASSESSMENT & PLAN NOTE
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  She is status post strabismus repair at age 4 then follow-up strabismus repair at age 13 in [place].  Significant scarring over the extraocular muscles and some small hyperdeviation when the eyes exotropic, but the hyperdeviation improves on center gaze with associated right superior oblique overaction.  Since uncertain as to what prior strabismus repair was attempted discussed reoperation with right lateral rectus muscle recession/right medial rectus muscle resection or advancement with adjustable suture.  If the muscle had slipped this may account for some of the hyperdeviation.  If not then might need vertical strabismus repair sometime in the future.  Also discussed that eyelid alignment may change following the strabismus repair necessitating referral to oculoplastics.  The other option would be to operate on the left better seeing eye however because of the degree of amblyopia her preference is to do a third operation on the right eye as the next step.  Gave information regarding the risks and benefits of the surgery and she will call if interested

## 2022-12-12 ENCOUNTER — OFFICE VISIT (OUTPATIENT)
Dept: MEDICAL GROUP | Facility: PHYSICIAN GROUP | Age: 37
End: 2022-12-12
Payer: COMMERCIAL

## 2022-12-12 VITALS
SYSTOLIC BLOOD PRESSURE: 106 MMHG | DIASTOLIC BLOOD PRESSURE: 60 MMHG | TEMPERATURE: 98.1 F | WEIGHT: 154 LBS | HEART RATE: 103 BPM | BODY MASS INDEX: 24.17 KG/M2 | OXYGEN SATURATION: 99 % | HEIGHT: 67 IN

## 2022-12-12 DIAGNOSIS — E03.8 OTHER SPECIFIED HYPOTHYROIDISM: ICD-10-CM

## 2022-12-12 DIAGNOSIS — Z11.59 NEED FOR HEPATITIS C SCREENING TEST: ICD-10-CM

## 2022-12-12 PROCEDURE — 99213 OFFICE O/P EST LOW 20 MIN: CPT | Performed by: NURSE PRACTITIONER

## 2022-12-12 RX ORDER — LEVOTHYROXINE SODIUM 0.03 MG/1
25 TABLET ORAL
Qty: 90 TABLET | Refills: 0 | Status: SHIPPED | OUTPATIENT
Start: 2022-12-12 | End: 2023-04-11 | Stop reason: SDUPTHER

## 2022-12-12 NOTE — PROGRESS NOTES
"Subjective:     CC: lab results    HPI:   Naila presents today with the following:    Other specified hypothyroidism  She has been taking levothyroxine 25 mcg daily on empty stomach. She has not noticed much difference in her symptoms. Her constipation is \"not that bad\" and is intermittent. She does have hair loss. Her fatigue is still present. She has started vitamin D replacement 2,000 units daily and ferrous sulfate 325 mg daily replacement.  She had recent labs completed that do show a TSH of 3.96 mIU/L, T4 1.2 ng/dL, T3 103 ng/dL and thyroid peroxidase antibodies 1 (less than 9 is negative).    Past Medical History:   Diagnosis Date    Anemia 2005    Headache(784.0)     Herpes zoster without complication 7/2/2018    Hypertension 2005    PIH last 2 weeks of pregnancy       Social History     Tobacco Use    Smoking status: Never    Smokeless tobacco: Never   Vaping Use    Vaping Use: Never used   Substance Use Topics    Alcohol use: No    Drug use: No       Current Outpatient Medications Ordered in Epic   Medication Sig Dispense Refill    Fexofenadine HCl (ALLEGRA PO) Take  by mouth.      levothyroxine (SYNTHROID) 25 MCG Tab Take 1 Tablet by mouth every morning on an empty stomach. 90 Tablet 0    cetirizine (ZYRTEC) 10 MG Tab Take 10 mg by mouth every day.      ferrous sulfate 325 (65 Fe) MG tablet Take 1 Tablet by mouth every day. 90 Tablet 1    Ibuprofen (ADVIL PO) Take  by mouth. bid       No current Epic-ordered facility-administered medications on file.       Allergies:  Avocado, Grass extracts [gramineae pollens], Metronidazole, Penicillins, and Tuna    Health Maintenance: Reviewed.  Hepatitis C screening ordered today.  She will complete with labs around February 2023.  We will schedule her annual Pap later 2023.      Objective:     Vital signs reviewed  Exam:  /60 (BP Location: Left arm, Patient Position: Sitting, BP Cuff Size: Adult)   Pulse (!) 103   Temp 36.7 °C (98.1 °F) (Temporal)   Ht 1.702 " "m (5' 7\")   Wt 69.9 kg (154 lb)   LMP 11/15/2022 (Exact Date)   SpO2 99%   BMI 24.12 kg/m²  Body mass index is 24.12 kg/m².    Gen: Alert and oriented, No apparent distress.  Neck: Neck is supple, full ROM.  Lungs: Normal effort, no audible wheezes  CV: Skin pink, warm and dry.  Ext: No clubbing, cyanosis, edema.      Assessment & Plan:     36 y.o. female with the following -     1. Other specified hypothyroidism  Chronic stable problem.  Discussed and reviewed her recent lab results.  Recent labs do show improvement in TSH.  She has started supplementation for her vitamin D deficiency and iron.  We discussed continuing with levothyroxine 25 mcg daily on an empty stomach and rechecking her labs around February 2023.  We discussed that vitamin D deficiency and iron deficiency can also contribute to hair loss and fatigue.  Consider increasing levothyroxine if symptoms persist with February 2023 labs.  - levothyroxine (SYNTHROID) 25 MCG Tab; Take 1 Tablet by mouth every morning on an empty stomach.  Dispense: 90 Tablet; Refill: 0  - FREE THYROXINE; Future  - TSH; Future  - TRIIDOTHYRONINE; Future      Return in about 2 months (around 2/12/2023) for Labs.    Please note that this dictation was created using voice recognition software. I have made every reasonable attempt to correct obvious errors, but I expect that there are errors of grammar and possibly content that I did not discover before finalizing the note.        "

## 2022-12-12 NOTE — ASSESSMENT & PLAN NOTE
"She has been taking levothyroxine 25 mcg daily on empty stomach. She has not noticed much difference in her symptoms. Her constipation is \"not that bad\" and is intermittent. She does have hair loss. Her fatigue is still present. She has started vitamin D replacement 2,000 units daily and ferrous sulfate 325 mg daily replacement.  She had recent labs completed that do show a TSH of 3.96 mIU/L, T4 1.2 ng/dL, T3 103 ng/dL and thyroid peroxidase antibodies 1 (less than 9 is negative).  "

## 2023-02-13 ENCOUNTER — APPOINTMENT (OUTPATIENT)
Dept: MEDICAL GROUP | Facility: PHYSICIAN GROUP | Age: 38
End: 2023-02-13
Payer: COMMERCIAL

## 2023-02-23 ENCOUNTER — APPOINTMENT (OUTPATIENT)
Dept: MEDICAL GROUP | Facility: PHYSICIAN GROUP | Age: 38
End: 2023-02-23
Payer: COMMERCIAL

## 2023-03-07 ENCOUNTER — APPOINTMENT (OUTPATIENT)
Dept: MEDICAL GROUP | Facility: PHYSICIAN GROUP | Age: 38
End: 2023-03-07
Payer: COMMERCIAL

## 2023-04-11 ENCOUNTER — OFFICE VISIT (OUTPATIENT)
Dept: MEDICAL GROUP | Facility: PHYSICIAN GROUP | Age: 38
End: 2023-04-11
Payer: COMMERCIAL

## 2023-04-11 VITALS
HEART RATE: 96 BPM | TEMPERATURE: 98.1 F | OXYGEN SATURATION: 99 % | DIASTOLIC BLOOD PRESSURE: 64 MMHG | WEIGHT: 151 LBS | BODY MASS INDEX: 23.7 KG/M2 | SYSTOLIC BLOOD PRESSURE: 106 MMHG | HEIGHT: 67 IN

## 2023-04-11 DIAGNOSIS — E03.8 OTHER SPECIFIED HYPOTHYROIDISM: ICD-10-CM

## 2023-04-11 DIAGNOSIS — D50.8 IRON DEFICIENCY ANEMIA SECONDARY TO INADEQUATE DIETARY IRON INTAKE: ICD-10-CM

## 2023-04-11 DIAGNOSIS — Z23 NEED FOR VACCINATION: ICD-10-CM

## 2023-04-11 PROBLEM — Z09 HOSPITAL DISCHARGE FOLLOW-UP: Status: RESOLVED | Noted: 2022-08-25 | Resolved: 2023-04-11

## 2023-04-11 PROCEDURE — 90746 HEPB VACCINE 3 DOSE ADULT IM: CPT | Performed by: NURSE PRACTITIONER

## 2023-04-11 PROCEDURE — 90471 IMMUNIZATION ADMIN: CPT | Performed by: NURSE PRACTITIONER

## 2023-04-11 PROCEDURE — 99214 OFFICE O/P EST MOD 30 MIN: CPT | Mod: 25 | Performed by: NURSE PRACTITIONER

## 2023-04-11 RX ORDER — LEVOTHYROXINE SODIUM 0.03 MG/1
25 TABLET ORAL
Qty: 90 TABLET | Refills: 3 | Status: SHIPPED | OUTPATIENT
Start: 2023-04-11

## 2023-04-11 RX ORDER — FERROUS SULFATE 325(65) MG
325 TABLET ORAL
Qty: 45 TABLET | Refills: 3 | Status: SHIPPED | OUTPATIENT
Start: 2023-04-11 | End: 2024-03-04 | Stop reason: SDUPTHER

## 2023-04-11 ASSESSMENT — PATIENT HEALTH QUESTIONNAIRE - PHQ9: CLINICAL INTERPRETATION OF PHQ2 SCORE: 0

## 2023-04-11 NOTE — PROGRESS NOTES
"Subjective:     CC: lab results     HPI:   Naila presents today with the following:    Other specified hypothyroidism  She continues with levothyroxine 25 mcg daily on an empty stomach.  She had recent labs completed that show TSH within normal limits. Her constipation is improving. Her fatigue is improving. She still has hair loss, March more than April.     Iron deficiency anemia  She is taking ferrous sulfate 325 mg daily. She has intermittent constipation.  Recent labs show improved hemoglobin and hematocrit along with iron and ferritin levels.  We will plan to transition to every other day dosing.      Past Medical History:   Diagnosis Date    Anemia 2005    Headache(784.0)     Herpes zoster without complication 7/2/2018    Hypertension 2005    PIH last 2 weeks of pregnancy       Social History     Tobacco Use    Smoking status: Never    Smokeless tobacco: Never   Vaping Use    Vaping Use: Never used   Substance Use Topics    Alcohol use: No    Drug use: No       Current Outpatient Medications Ordered in Epic   Medication Sig Dispense Refill    ferrous sulfate 325 (65 Fe) MG tablet Take 1 Tablet by mouth every 48 hours. 45 Tablet 3    levothyroxine (SYNTHROID) 25 MCG Tab Take 1 Tablet by mouth every morning on an empty stomach. 90 Tablet 3    Fexofenadine HCl (ALLEGRA PO) Take  by mouth.      cetirizine (ZYRTEC) 10 MG Tab Take 10 mg by mouth every day.      Ibuprofen (ADVIL PO) Take  by mouth. bid       No current Epic-ordered facility-administered medications on file.       Allergies:  Avocado, Grass extracts [gramineae pollens], Metronidazole, Penicillins, and Tuna    Health Maintenance: Reviewed.      Objective:     Vital signs reviewed  Exam:  /64 (BP Location: Left arm, Patient Position: Sitting, BP Cuff Size: Adult)   Pulse 96   Temp 36.7 °C (98.1 °F) (Temporal)   Ht 1.702 m (5' 7\")   Wt 68.5 kg (151 lb)   SpO2 99%   BMI 23.65 kg/m²  Body mass index is 23.65 kg/m².    Gen: Alert and oriented, " No apparent distress.  Neck: Neck is supple, full ROM.  Lungs: Normal effort, no audible wheezes  CV: Skin pink, warm and dry.  Ext: No clubbing, cyanosis, edema.      Assessment & Plan:     37 y.o. female with the following -     Discussed and reviewed lab results from 4/4/2023.    1. Other specified hypothyroidism  Chronic stable problem.  TSH has improved.  She will continue with her levothyroxine 25 mcg daily on an empty stomach.  We discussed that if her symptoms worsen we can discuss increasing her dose.  She verbalized understanding.  Check annual labs.  - levothyroxine (SYNTHROID) 25 MCG Tab; Take 1 Tablet by mouth every morning on an empty stomach.  Dispense: 90 Tablet; Refill: 3    2. Iron deficiency anemia secondary to inadequate dietary iron intake  Chronic stable problem.  Labs have improved.  She will continue with ferrous sulfate 325 mg every other day dosing.  - ferrous sulfate 325 (65 Fe) MG tablet; Take 1 Tablet by mouth every 48 hours.  Dispense: 45 Tablet; Refill: 3    3. Need for vaccination  Acute uncomplicated problem.  She is interested in hepatitis B vaccine. I have placed the below orders and discussed them with an approved delegating provider. The MA is performing the below orders under the direction of Dr. Lagos.  - Hep B Adult 20+      Return in about 4 weeks (around 5/9/2023) for Annual with Pap.    Please note that this dictation was created using voice recognition software. I have made every reasonable attempt to correct obvious errors, but I expect that there are errors of grammar and possibly content that I did not discover before finalizing the note.

## 2023-04-11 NOTE — ASSESSMENT & PLAN NOTE
She continues with levothyroxine 25 mcg daily on an empty stomach.  She had recent labs completed that show TSH within normal limits. Her constipation is improving. Her fatigue is improving. She still has hair loss, March more than April.

## 2023-04-11 NOTE — ASSESSMENT & PLAN NOTE
She is taking ferrous sulfate 325 mg daily. She has intermittent constipation.  Recent labs show improved hemoglobin and hematocrit along with iron and ferritin levels.  We will plan to transition to every other day dosing.

## 2023-06-14 ENCOUNTER — HOSPITAL ENCOUNTER (OUTPATIENT)
Facility: MEDICAL CENTER | Age: 38
End: 2023-06-14
Attending: NURSE PRACTITIONER
Payer: COMMERCIAL

## 2023-06-14 ENCOUNTER — OFFICE VISIT (OUTPATIENT)
Dept: MEDICAL GROUP | Facility: PHYSICIAN GROUP | Age: 38
End: 2023-06-14
Payer: COMMERCIAL

## 2023-06-14 VITALS
DIASTOLIC BLOOD PRESSURE: 62 MMHG | TEMPERATURE: 97.3 F | BODY MASS INDEX: 23.7 KG/M2 | SYSTOLIC BLOOD PRESSURE: 104 MMHG | HEIGHT: 67 IN | HEART RATE: 91 BPM | OXYGEN SATURATION: 96 % | WEIGHT: 151 LBS

## 2023-06-14 DIAGNOSIS — R35.0 URINARY FREQUENCY: ICD-10-CM

## 2023-06-14 DIAGNOSIS — Z12.4 SCREENING FOR MALIGNANT NEOPLASM OF CERVIX: ICD-10-CM

## 2023-06-14 DIAGNOSIS — R82.998 URINE LEUKOCYTES: ICD-10-CM

## 2023-06-14 DIAGNOSIS — Z23 NEED FOR VACCINATION: ICD-10-CM

## 2023-06-14 DIAGNOSIS — Z01.419 WELL WOMAN EXAM WITH ROUTINE GYNECOLOGICAL EXAM: ICD-10-CM

## 2023-06-14 LAB
AMBIGUOUS DTTM AMBI4: NORMAL
APPEARANCE UR: CLEAR
BILIRUB UR STRIP-MCNC: NEGATIVE MG/DL
COLOR UR AUTO: YELLOW
FORWARD REASON: SPWHY: NORMAL
FORWARDED TO LAB: SPWHR: NORMAL
GLUCOSE UR STRIP.AUTO-MCNC: NEGATIVE MG/DL
KETONES UR STRIP.AUTO-MCNC: NEGATIVE MG/DL
LEUKOCYTE ESTERASE UR QL STRIP.AUTO: NORMAL
NITRITE UR QL STRIP.AUTO: NEGATIVE
PH UR STRIP.AUTO: 6.5 [PH] (ref 5–8)
PROT UR QL STRIP: NEGATIVE MG/DL
RBC UR QL AUTO: NORMAL
SP GR UR STRIP.AUTO: 1.01
SPECIMEN SENT: SPWT1: NORMAL
UROBILINOGEN UR STRIP-MCNC: 0.2 MG/DL

## 2023-06-14 PROCEDURE — 90746 HEPB VACCINE 3 DOSE ADULT IM: CPT | Performed by: NURSE PRACTITIONER

## 2023-06-14 PROCEDURE — 3078F DIAST BP <80 MM HG: CPT | Performed by: NURSE PRACTITIONER

## 2023-06-14 PROCEDURE — 81002 URINALYSIS NONAUTO W/O SCOPE: CPT | Performed by: NURSE PRACTITIONER

## 2023-06-14 PROCEDURE — 3074F SYST BP LT 130 MM HG: CPT | Performed by: NURSE PRACTITIONER

## 2023-06-14 PROCEDURE — 99000 SPECIMEN HANDLING OFFICE-LAB: CPT | Performed by: NURSE PRACTITIONER

## 2023-06-14 PROCEDURE — 87077 CULTURE AEROBIC IDENTIFY: CPT

## 2023-06-14 PROCEDURE — 90471 IMMUNIZATION ADMIN: CPT | Performed by: NURSE PRACTITIONER

## 2023-06-14 PROCEDURE — 87086 URINE CULTURE/COLONY COUNT: CPT

## 2023-06-14 PROCEDURE — 99213 OFFICE O/P EST LOW 20 MIN: CPT | Mod: 25 | Performed by: NURSE PRACTITIONER

## 2023-06-14 PROCEDURE — 2023F DILAT RTA XM W/O RTNOPTHY: CPT | Performed by: NURSE PRACTITIONER

## 2023-06-14 PROCEDURE — 99395 PREV VISIT EST AGE 18-39: CPT | Mod: 25 | Performed by: NURSE PRACTITIONER

## 2023-06-14 NOTE — PROGRESS NOTES
Subjective:     CC:   Chief Complaint   Patient presents with    Annual Exam       HPI:   Naila Mcclelland is a 37 y.o. female who presents for annual exam. She is feeling well and denies any complaints.    Urinary frequency  The urinary frequency started 2 days ago. Has noticed that her clitoris feels swollen and painful. Denies dysuria, hematuria, nausea, vomiting, flank pain, urinary urgency. This does not feel like previous UTI. She is able to empty her bladder completely.         Ob-Gyn/ History:    Patient has GYN provider: no  /Para:  4/2  Last Pap Smear:  2012. No history of abnormal pap smears.  Gyn Surgery:   and D&C.  Current Contraceptive Method:  vasectomy. She is currently sexually active.  Last menstrual period:  2023.  Periods regular. light, heavy bleeding. Cramping is moderate.   She does take OTC analgesics for cramps.  No significant bloating/fluid retention, pelvic pain. + dyspareunia for 2 days. No vaginal discharge.  Urinary incontinence: no  Folate intake: not taking     Health Maintenance  Cholesterol Screening: labs ordered   Diabetes Screening: labs ordered   Diet: She is cooking meals from home, no fast food, no fried foods.    Exercise: She is walking and working.    Substance Abuse: discussed and reviewed   Safe in relationship.   Seat belts safety discussed.  Sun protection used.    Cancer screening  Colorectal Cancer Screening: n/a    Lung Cancer Screening: n/a    Cervical Cancer Screening: Due    Breast Cancer Screening: n/a     Infectious disease screening/Immunizations  --STI Screening: yes for G&C  --Practices safe sex.  --HIV Screening: declines   --Hepatitis C Screening: ordered 2022   --Immunizations:    Influenza: Completed 10/27/2022    Tetanus: Completed 2013    Shingles: n/a    Pneumococcal : n/a     Other immunizations: Due for COVID booster and hepatitis B vaccines.     She  has a past medical history of Anemia (2005), Headache(784.0),  Herpes zoster without complication (7/2/2018), and Hypertension (2005).    She has no past medical history of Addisons disease (HCC), Adrenal disorder (HCC), Allergy, Anxiety, Arrhythmia, Arthritis, ASTHMA, Blood transfusion, Cancer (HCC), CATARACT, CHF (congestive heart failure) (HCC), Clotting disorder (HCC), COPD, Cushings syndrome (HCC), Depression, Diabetes, Diabetic neuropathy (HCC), EMPHYSEMA, GERD (gastroesophageal reflux disease), Glaucoma, Goiter, Heart attack (HCC), Heart murmur, HIV (human immunodeficiency virus infection), Hyperlipidemia, IBD (inflammatory bowel disease), Kidney disease, Meningitis, Migraine, Muscle disorder, OSTEOPOROSIS, Parathyroid disorder (HCC), Pituitary disease (HCC), Seizure (HCC), Sickle cell disease (HCC), Stroke (HCC), Substance abuse (HCC), Thyroid disease, Tuberculosis, Ulcer, or Urinary tract infection, site not specified.  She  has a past surgical history that includes dilation and curettage (2010,2011) and primary c section (7/9/2013).    Family History   Problem Relation Age of Onset    Diabetes Mother     Hypertension Mother     Hyperlipidemia Mother     Thyroid Father         hyperthyroid    Other Brother         false positive on thyroid    Kidney Disease Brother         1 kidney    Schizophrenia Brother 29    Cancer Maternal Grandmother         Brain tumor    No Known Problems Maternal Grandfather     Arthritis Paternal Grandmother     No Known Problems Paternal Grandfather     No Known Problems Daughter     No Known Problems Son        Social History     Socioeconomic History    Marital status:      Spouse name: Brandyn Wilder    Number of children: 2    Years of education: [employer] college    Highest education level: Not on file   Occupational History    Occupation: [employer] owner     Comment: [employer]   Tobacco Use    Smoking status: Never    Smokeless tobacco: Never   Vaping Use    Vaping Use: Never used   Substance and Sexual Activity    Alcohol use:  No    Drug use: No    Sexual activity: Yes     Partners: Male     Birth control/protection: Male Sterilization     Comment:    Other Topics Concern     Service Not Asked    Blood Transfusions Not Asked    Caffeine Concern Not Asked    Occupational Exposure Not Asked    Hobby Hazards Not Asked    Sleep Concern Not Asked    Stress Concern Not Asked    Weight Concern Not Asked    Special Diet Not Asked    Back Care Not Asked    Exercise Yes    Bike Helmet Not Asked    Seat Belt Yes    Self-Exams Not Asked   Social History [place]    Has 2 kids    Son 13 yr and daughter 5 yrs    Runs [employer] with     Lives with  and kids in [place]    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[place], and moved to [place] in 2012     Social Determinants of Health     Financial Resource Strain: Not on file   Food Insecurity: Not on file   Transportation Needs: Not on file   Physical Activity: Not on file   Stress: Not on file   Social Connections: Not on file   Intimate Partner Violence: Not on file   Housing Stability: Not on file       Patient Active Problem List    Diagnosis Date Noted    Urinary frequency 06/14/2023    Anisometropia 11/21/2022    Ophthalmoplegia 11/21/2022    Acute pain of right shoulder 09/14/2022    Hives 11/12/2018    Positive STELLA (antinuclear antibody) 02/25/2018    Environmental allergies 02/21/2018    Other specified hypothyroidism 02/21/2018    Iron deficiency anemia 12/13/2017    Vitamin D deficiency 10/16/2017    Microcytic anemia 05/02/2013         Current Outpatient Medications   Medication Sig Dispense Refill    ferrous sulfate 325 (65 Fe) MG tablet Take 1 Tablet by mouth every 48 hours. 45 Tablet 3    levothyroxine (SYNTHROID) 25 MCG Tab Take 1 Tablet by mouth every morning on an empty stomach. 90 Tablet 3    Fexofenadine HCl (ALLEGRA PO) Take  by mouth.      cetirizine (ZYRTEC) 10 MG Tab Take 10 mg by mouth every day.      Ibuprofen (ADVIL  "PO) Take  by mouth. bid       No current facility-administered medications for this visit.     Allergies   Allergen Reactions    Avocado Hives    Grass Extracts [Gramineae Pollens] Runny Nose and Itching     Pt states she has allergies when grass is being cut    Metronidazole     Penicillins Hives    Tuna Unspecified     Pt had Positive allergy test.       Review of Systems   Constitutional: Negative for fever, chills and malaise/fatigue.   HENT: Negative for congestion.    Eyes: Negative for pain.   Respiratory: Negative for cough and shortness of breath.    Cardiovascular: Negative for leg swelling.   Gastrointestinal: Negative for nausea, vomiting, abdominal pain and diarrhea.   Genitourinary: Negative for dysuria and hematuria. Positive for urinary frequency and clitoris pain.  Skin: Negative for rash.   Neurological: Negative for dizziness, focal weakness and headaches.   Endo/Heme/Allergies: Does not bruise/bleed easily.   Psychiatric/Behavioral: Negative for depression.  The patient is not nervous/anxious.      Objective:     Vital signs revewed  /62 (BP Location: Left arm, Patient Position: Sitting, BP Cuff Size: Adult)   Pulse 91   Temp 36.3 °C (97.3 °F) (Temporal)   Ht 1.702 m (5' 7\")   Wt 68.5 kg (151 lb)   LMP 06/01/2023 (Approximate)   SpO2 96%   BMI 23.65 kg/m²   Body mass index is 23.65 kg/m².  Wt Readings from Last 4 Encounters:   06/14/23 68.5 kg (151 lb)   04/11/23 68.5 kg (151 lb)   12/12/22 69.9 kg (154 lb)   10/27/22 68.9 kg (152 lb)       Physical Exam:  Constitutional: Well-developed and well-nourished. Not diaphoretic. No distress.   Skin: Skin is warm and dry. No rash noted.  Head: Atraumatic without lesions.  Eyes: Conjunctivae and extraocular motions are normal. Pupils are equal, round, and reactive to light. No scleral icterus.   Ears:  External ears unremarkable. Tympanic membranes clear and intact.  Nose: Nares patent. Septum midline. Turbinates without erythema nor edema. " No discharge.   Mouth/Throat: Dentition is intact. Tongue normal. Oropharynx is clear and moist. Posterior pharynx without erythema or exudates. Tonsils 1+.   Neck: Supple, trachea midline. Normal range of motion. No thyromegaly present. No lymphadenopathy--cervical or supraclavicular.  Cardiovascular: Regular rate and rhythm, S1 and S2 without murmur, rubs, or gallops.  Lungs: Normal inspiratory effort, CTA bilaterally, no wheezes/rhonchi/rales  Abdomen: Soft, non tender, and without distention. Active bowel sounds in all four quadrants. No rebound, guarding, masses or HSM.  :Perineum and external genitalia normal without rash. Vagina with copious, white, thin, thick, and odorless discharge. Cervix without visible lesions or discharge. Bimanual exam without adnexal masses or cervical motion tenderness. Tender to palpation on exam to clitoris, no fissures or discharge present.   Extremities: No cyanosis, clubbing, erythema, nor edema. Distal pulses intact and symmetric.   Musculoskeletal: All major joints AROM full in all directions without pain.  Neurological: Alert and oriented x 3.   Psychiatric:  Behavior, mood, and affect are appropriate.    A chaperone was offered to the patient during today's exam. Patient declined chaperone.    Lab:     Latest Reference Range & Units 06/14/23 15:42   POC Color Negative  YELLOW   POC Appearance Negative  CLEAR   POC Specific Gravity <1.005 - >1.030  1.010   POC Urine PH 5.0 - 8.0  6.5   POC Glucose Negative mg/dL NEGATIVE   POC Ketones Negative mg/dL NEGATIVE   POC Protein Negative mg/dL NEGATIVE   POC Nitrites Negative  NEGATIVE   POC Leukocyte Esterase Negative  SMALL   POC Blood Negative  SMALL   POC Bilirubin Negative mg/dL NEGATIVE   POC Urobiligen Negative (0.2) mg/dL 0.2     Assessment and Plan:     1. Well woman exam with routine gynecological exam  2. Screening for malignant neoplasm of cervix  Acute uncomplicated problem.  Well woman exam completed today.  She is  interested in testing for gonorrhea and chlamydia.  In regards to her clitoris recommend that she clean with warm water, avoid any soaps or chemicals.  Checking urine culture.  - THINPREP PAP W/HPV AND CTNG; Future    3. Urinary frequency  Acute uncomplicated problem.  POCT urinalysis did show small blood and small leukocytes.  She would like to hold off on antibiotics at this time and complete a urine culture first.  We did discuss that if her urine culture is positive then we will send over antibiotics.  She is in agreement.  We did discuss that she is here for an annual visit and if having urinary frequency we will add this as an acute visit.  She verbalized understanding.  - POCT Urinalysis    4. Urine leukocytes  Acute uncomplicated problem.  See #3 above.  - URINE CULTURE(NEW); Future    5. Need for vaccination  Acute uncomplicated problem.  She is interested in her second dose of hepatitis B vaccine today. I have placed the below orders and discussed them with an approved delegating provider. The MA is performing the below orders under the direction of Mihaela Abraham PA-C.  - Hep B Adult 20+      HCM:  Due for pap and second dose of Hepatitis B. Encouraged to complete COVID booster at pharmacy.    Labs per orders  Immunizations per orders  Patient counseled about skin care, diet, supplements, prenatal vitamins, safe sex and exercise.    Follow-up: Return in about 1 year (around 6/14/2024) for annual.      Please note that this dictation was created using voice recognition software. I have made every reasonable attempt to correct obvious errors, but I expect that there are errors of grammar and possibly content that I did not discover before finalizing the note.

## 2023-06-14 NOTE — ASSESSMENT & PLAN NOTE
The urinary frequency started 2 days ago. Has noticed that her clitoris feels swollen and painful. Denies dysuria, hematuria, nausea, vomiting, flank pain, urinary urgency. This does not feel like previous UTI. She is able to empty her bladder completely.

## 2023-06-17 LAB
BACTERIA UR CULT: NORMAL
SIGNIFICANT IND 70042: NORMAL
SITE SITE: NORMAL
SOURCE SOURCE: NORMAL

## 2023-09-26 ENCOUNTER — TELEPHONE (OUTPATIENT)
Dept: OPHTHALMOLOGY | Facility: MEDICAL CENTER | Age: 38
End: 2023-09-26

## 2023-09-26 ENCOUNTER — APPOINTMENT (OUTPATIENT)
Dept: OPHTHALMOLOGY | Facility: MEDICAL CENTER | Age: 38
End: 2023-09-26
Payer: COMMERCIAL

## 2023-09-27 NOTE — PATIENT INSTRUCTIONS
Shingles  Shingles is an infection that causes a painful skin rash and fluid-filled blisters. Shingles is caused by the same virus that causes chickenpox.  Shingles only develops in people who:  · Have had chickenpox.  · Have gotten the chickenpox vaccine. (This is rare.)  The first symptoms of shingles may be itching, tingling, or pain in an area on your skin. A rash will follow in a few days or weeks. The rash is usually on one side of the body in a bandlike or beltlike pattern. Over time, the rash turns into fluid-filled blisters that break open, scab over, and dry up. Medicines may:  · Help you manage pain.  · Help you recover more quickly.  · Help to prevent long-term problems.  Follow these instructions at home:  Medicines  · Take medicines only as told by your doctor.  · Apply an anti-itch or numbing cream to the affected area as told by your doctor.  Blister and Rash Care  · Take a cool bath or put cool compresses on the area of the rash or blisters as told by your doctor. This may help with pain and itching.  · Keep your rash covered with a loose bandage (dressing). Wear loose-fitting clothing.  · Keep your rash and blisters clean with mild soap and cool water or as told by your doctor.  · Check your rash every day for signs of infection. These include redness, swelling, and pain that lasts or gets worse.  · Do not pick your blisters.  · Do not scratch your rash.  General instructions  · Rest as told by your doctor.  · Keep all follow-up visits as told by your doctor. This is important.  · Until your blisters scab over, your infection can cause chickenpox in people who have never had it or been vaccinated against it. To prevent this from happening, avoid touching other people or being around other people, especially:  ¨ Babies.  ¨ Pregnant women.  ¨ Children who have eczema.  ¨ Elderly people who have transplants.  ¨ People who have chronic illnesses, such as leukemia or AIDS.  Contact a doctor if:  · Your  pain does not get better with medicine.  · Your pain does not get better after the rash heals.  · Your rash looks infected. Signs of infection include:  ¨ Redness.  ¨ Swelling.  ¨ Pain that lasts or gets worse.  Get help right away if:  · The rash is on your face or nose.  · You have pain in your face, pain around your eye area, or loss of feeling on one side of your face.  · You have ear pain or you have ringing in your ear.  · You have loss of taste.  · Your condition gets worse.  This information is not intended to replace advice given to you by your health care provider. Make sure you discuss any questions you have with your health care provider.  Document Released: 06/05/2009 Document Revised: 08/13/2017 Document Reviewed: 09/29/2015  [employer] Interactive Patient Education © 2017 [employer]     Graft Donor Site Bandage (Optional-Leave Blank If You Don't Want In Note): A Pressure bandage were applied to the donor site.

## 2023-10-17 ENCOUNTER — OFFICE VISIT (OUTPATIENT)
Dept: OPHTHALMOLOGY | Facility: MEDICAL CENTER | Age: 38
End: 2023-10-17

## 2023-10-17 DIAGNOSIS — H49.9 OPHTHALMOPLEGIA: ICD-10-CM

## 2023-10-17 DIAGNOSIS — H52.31 ANISOMETROPIA: ICD-10-CM

## 2023-10-17 PROCEDURE — 92060 SENSORIMOTOR EXAMINATION: CPT | Performed by: OPHTHALMOLOGY

## 2023-10-17 PROCEDURE — 99214 OFFICE O/P EST MOD 30 MIN: CPT | Performed by: OPHTHALMOLOGY

## 2023-10-17 ASSESSMENT — REFRACTION_MANIFEST
OD_AXIS: 081
OD_CYLINDER: +6.50
OS_CYLINDER: +2.00
OS_SPHERE: +0.75
METHOD_AUTOREFRACTION: 1
OD_SPHERE: -0.25
OS_AXIS: 085

## 2023-10-17 ASSESSMENT — REFRACTION_WEARINGRX
SPECS_TYPE: SVL
OS_CYLINDER: +1.75
OD_AXIS: 084
OD_SPHERE: -0.50
OD_CYLINDER: +2.50
OS_AXIS: 078
OS_SPHERE: +0.25

## 2023-10-17 ASSESSMENT — SLIT LAMP EXAM - LIDS
COMMENTS: NORMAL
COMMENTS: NORMAL

## 2023-10-17 ASSESSMENT — TONOMETRY
OD_IOP_MMHG: 13
OS_IOP_MMHG: 16

## 2023-10-17 ASSESSMENT — ENCOUNTER SYMPTOMS: BLURRED VISION: 1

## 2023-10-17 ASSESSMENT — EXTERNAL EXAM - RIGHT EYE: OD_EXAM: NORMAL

## 2023-10-17 ASSESSMENT — EXTERNAL EXAM - LEFT EYE: OS_EXAM: NORMAL

## 2023-10-17 ASSESSMENT — VISUAL ACUITY
CORRECTION_TYPE: GLASSES
OS_CC: 20/20
OD_CC: 20/200
METHOD: SNELLEN - LINEAR

## 2023-10-17 ASSESSMENT — CUP TO DISC RATIO
OS_RATIO: 0.1
OD_RATIO: 0.1

## 2023-10-17 NOTE — ASSESSMENT & PLAN NOTE
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  She is status post strabismus repair at age 4 then follow-up strabismus repair at age 13 in [place].  Significant scarring over the extraocular muscles and some small hyperdeviation when the eyes exotropic, but the hyperdeviation improves on center gaze with associated right superior oblique overaction.  Since uncertain as to what prior strabismus repair was attempted discussed reoperation with right lateral rectus muscle recession/right medial rectus muscle resection or advancement with adjustable suture.  If the muscle had slipped this may account for some of the hyperdeviation.  If not then might need vertical strabismus repair sometime in the future.  Also discussed that eyelid alignment may change following the strabismus repair necessitating referral to oculoplastics.  The other option would be to operate on the left better seeing eye however because of the degree of amblyopia her preference is to do a third operation on the right eye as the next step.  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 Still with 40 RXT.  Discussed Re op exploration of right medial and right lateral rectus muscle with adjustment, slight infero placement.

## 2023-12-19 ENCOUNTER — HOSPITAL ENCOUNTER (OUTPATIENT)
Facility: MEDICAL CENTER | Age: 38
End: 2023-12-19
Attending: OPHTHALMOLOGY | Admitting: OPHTHALMOLOGY
Payer: COMMERCIAL

## 2024-01-11 ENCOUNTER — TELEPHONE (OUTPATIENT)
Dept: OPHTHALMOLOGY | Facility: MEDICAL CENTER | Age: 39
End: 2024-01-11

## 2024-01-11 NOTE — TELEPHONE ENCOUNTER
Left Voice message to let patient know that Surgery with  is canceled due to her being unresponsive. If she would like to continue with the procedure she will have to give the office a call.     I also spoke with Bonnie in the OR Scheduling to cancel surgery.

## 2024-01-11 NOTE — TELEPHONE ENCOUNTER
Left voice message to let patient know Authorization was not approved, and to ask to give me a call if she wanted to continue surgery and start an appeal.

## 2024-03-04 ENCOUNTER — HOSPITAL ENCOUNTER (OUTPATIENT)
Facility: MEDICAL CENTER | Age: 39
End: 2024-03-04
Attending: NURSE PRACTITIONER
Payer: COMMERCIAL

## 2024-03-04 ENCOUNTER — OFFICE VISIT (OUTPATIENT)
Dept: MEDICAL GROUP | Facility: PHYSICIAN GROUP | Age: 39
End: 2024-03-04
Payer: COMMERCIAL

## 2024-03-04 VITALS
HEIGHT: 67 IN | OXYGEN SATURATION: 98 % | HEART RATE: 90 BPM | TEMPERATURE: 97.5 F | SYSTOLIC BLOOD PRESSURE: 98 MMHG | DIASTOLIC BLOOD PRESSURE: 60 MMHG | BODY MASS INDEX: 23.39 KG/M2 | WEIGHT: 149 LBS

## 2024-03-04 DIAGNOSIS — D50.8 IRON DEFICIENCY ANEMIA SECONDARY TO INADEQUATE DIETARY IRON INTAKE: ICD-10-CM
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
## 2024-03-04 DIAGNOSIS — L81.9 HYPERPIGMENTATION OF SKIN: ICD-10-CM

## 2024-03-04 DIAGNOSIS — N30.01 ACUTE CYSTITIS WITH HEMATURIA: ICD-10-CM

## 2024-03-04 DIAGNOSIS — M79.10 MUSCLE SORENESS: ICD-10-CM

## 2024-03-04 LAB
APPEARANCE UR: NORMAL
BILIRUB UR STRIP-MCNC: NEGATIVE MG/DL
COLOR UR AUTO: NORMAL
GLUCOSE UR STRIP.AUTO-MCNC: NEGATIVE MG/DL
KETONES UR STRIP.AUTO-MCNC: NEGATIVE MG/DL
LEUKOCYTE ESTERASE UR QL STRIP.AUTO: NORMAL
NITRITE UR QL STRIP.AUTO: NEGATIVE
PH UR STRIP.AUTO: 7 [PH] (ref 5–8)
PROT UR QL STRIP: NEGATIVE MG/DL
RBC UR QL AUTO: NORMAL
SP GR UR STRIP.AUTO: 1.02
UROBILINOGEN UR STRIP-MCNC: 0.2 MG/DL

## 2024-03-04 PROCEDURE — 99214 OFFICE O/P EST MOD 30 MIN: CPT | Performed by: NURSE PRACTITIONER

## 2024-03-04 PROCEDURE — 3078F DIAST BP <80 MM HG: CPT | Performed by: NURSE PRACTITIONER

## 2024-03-04 PROCEDURE — 81002 URINALYSIS NONAUTO W/O SCOPE: CPT | Performed by: NURSE PRACTITIONER

## 2024-03-04 PROCEDURE — 87086 URINE CULTURE/COLONY COUNT: CPT

## 2024-03-04 PROCEDURE — 3074F SYST BP LT 130 MM HG: CPT | Performed by: NURSE PRACTITIONER

## 2024-03-04 PROCEDURE — 2023F DILAT RTA XM W/O RTNOPTHY: CPT | Performed by: NURSE PRACTITIONER

## 2024-03-04 RX ORDER — FERROUS SULFATE 325(65) MG
325 TABLET ORAL
Qty: 45 TABLET | Refills: 1 | Status: SHIPPED | OUTPATIENT
Start: 2024-03-04

## 2024-03-04 RX ORDER — SULFAMETHOXAZOLE AND TRIMETHOPRIM 800; 160 MG/1; MG/1
1 TABLET ORAL 2 TIMES DAILY
Qty: 6 TABLET | Refills: 0 | Status: SHIPPED | OUTPATIENT
Start: 2024-03-04 | End: 2024-03-07

## 2024-03-04 NOTE — ASSESSMENT & PLAN NOTE
Continues ferrous sulfate 325 mg every other day dosing.  She does need a medication refill today and due for updated labs.
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
## 2024-03-06 LAB
BACTERIA UR CULT: NORMAL
SIGNIFICANT IND 70042: NORMAL
SITE SITE: NORMAL
SOURCE SOURCE: NORMAL

## 2024-05-13 ENCOUNTER — APPOINTMENT (RX ONLY)
Dept: URBAN - METROPOLITAN AREA CLINIC 22 | Facility: CLINIC | Age: 39
Setting detail: DERMATOLOGY
End: 2024-05-13

## 2024-05-13 DIAGNOSIS — Z71.89 OTHER SPECIFIED COUNSELING: ICD-10-CM

## 2024-05-13 DIAGNOSIS — L81.1 CHLOASMA: ICD-10-CM | Status: INADEQUATELY CONTROLLED

## 2024-05-13 PROCEDURE — ? PRESCRIPTION

## 2024-05-13 PROCEDURE — 99204 OFFICE O/P NEW MOD 45 MIN: CPT

## 2024-05-13 PROCEDURE — ? COUNSELING

## 2024-05-13 PROCEDURE — ? PHOTO-DOCUMENTATION

## 2024-05-13 RX ORDER — H-QUINONE/TRETINOIN/HYDROCORT 4 %-0.025%
1 EMULSION (GRAM) TOPICAL QD
Qty: 30 | Refills: 8 | Status: ERX | COMMUNITY
Start: 2024-05-13

## 2024-05-13 RX ORDER — TRETIONIN 0.25 MG/G
1 CREAM TOPICAL QHS
Qty: 45 | Refills: 3 | Status: ERX | COMMUNITY
Start: 2024-05-13

## 2024-05-13 RX ADMIN — TRETIONIN 1: 0.25 CREAM TOPICAL at 00:00

## 2024-05-13 RX ADMIN — Medication 1: at 00:00

## 2024-05-13 ASSESSMENT — LOCATION DETAILED DESCRIPTION DERM
LOCATION DETAILED: RIGHT CENTRAL MALAR CHEEK
LOCATION DETAILED: LEFT CENTRAL MALAR CHEEK

## 2024-05-13 ASSESSMENT — LOCATION ZONE DERM: LOCATION ZONE: FACE

## 2024-05-13 ASSESSMENT — LOCATION SIMPLE DESCRIPTION DERM
LOCATION SIMPLE: LEFT CHEEK
LOCATION SIMPLE: RIGHT CHEEK

## 2024-05-13 ASSESSMENT — SEVERITY ASSESSMENT: SEVERITY: SEVERE, MARKEDELY DARKER THAN THE SURROUNDING SKIN

## 2024-05-30 DIAGNOSIS — E03.8 OTHER SPECIFIED HYPOTHYROIDISM: ICD-10-CM

## 2024-05-30 RX ORDER — LEVOTHYROXINE SODIUM 0.03 MG/1
25 TABLET ORAL
Qty: 90 TABLET | Refills: 3 | Status: SHIPPED | OUTPATIENT
Start: 2024-05-30

## 2024-05-30 NOTE — TELEPHONE ENCOUNTER
Received request via: Pharmacy    Was the patient seen in the last year in this department? Yes    Does the patient have an active prescription (recently filled or refills available) for medication(s) requested? No    [email or web address]    Does the patient have [facility] Plus and need 100 day supply (blood pressure, diabetes and cholesterol meds only)? Patient does not have SCP

## 2024-05-31 NOTE — TELEPHONE ENCOUNTER
Requested Prescriptions     Signed Prescriptions Disp Refills    levothyroxine (SYNTHROID) 25 MCG Tab 90 Tablet 3     Sig: Take 1 Tablet by mouth every morning on an empty stomach.     Authorizing Provider: NAHUM VELARDE for TSH, order placed    RAFAEL Reis

## 2024-10-04 ENCOUNTER — APPOINTMENT (RX ONLY)
Dept: URBAN - METROPOLITAN AREA CLINIC 20 | Facility: CLINIC | Age: 39
Setting detail: DERMATOLOGY
End: 2024-10-04

## 2024-10-04 NOTE — HPI: COSMETIC CONSULTATION
When Outside In The Sun, Do You...: never burns, always tans
Additional History: Never had lasers before, pt is on topical medications to tx. Charissa wants to tx w/ cosmalan mask

## 2025-01-06 ENCOUNTER — APPOINTMENT (OUTPATIENT)
Dept: URBAN - METROPOLITAN AREA CLINIC 22 | Facility: CLINIC | Age: 40
Setting detail: DERMATOLOGY
End: 2025-01-06

## 2025-01-06 DIAGNOSIS — Z71.89 OTHER SPECIFIED COUNSELING: ICD-10-CM

## 2025-01-06 DIAGNOSIS — L81.1 CHLOASMA: ICD-10-CM

## 2025-01-06 PROCEDURE — ? COUNSELING

## 2025-01-06 PROCEDURE — ? PRESCRIPTION

## 2025-01-06 PROCEDURE — 99214 OFFICE O/P EST MOD 30 MIN: CPT

## 2025-01-06 PROCEDURE — ? ADDITIONAL NOTES

## 2025-01-06 PROCEDURE — ? PRESCRIPTION MEDICATION MANAGEMENT

## 2025-01-06 RX ORDER — AZELAIC ACID 0.15 G/G
GEL TOPICAL
Qty: 50 | Refills: 5 | Status: ERX | COMMUNITY
Start: 2025-01-06

## 2025-01-06 RX ADMIN — AZELAIC ACID: 0.15 GEL TOPICAL at 00:00

## 2025-01-06 ASSESSMENT — LOCATION DETAILED DESCRIPTION DERM
LOCATION DETAILED: LEFT CENTRAL MALAR CHEEK
LOCATION DETAILED: RIGHT CENTRAL MALAR CHEEK

## 2025-01-06 ASSESSMENT — SEVERITY ASSESSMENT: SEVERITY: MODERATE, MODERATELY DARKER THAN THE SURROUNDING NORMAL SKIN

## 2025-01-06 ASSESSMENT — LOCATION SIMPLE DESCRIPTION DERM
LOCATION SIMPLE: LEFT CHEEK
LOCATION SIMPLE: RIGHT CHEEK

## 2025-01-06 ASSESSMENT — LOCATION ZONE DERM: LOCATION ZONE: FACE

## 2025-01-06 NOTE — PROCEDURE: PRESCRIPTION MEDICATION MANAGEMENT
Detail Level: Zone
Plan: Follow up in 6 months
Render In Strict Bullet Format?: No
Initiate Treatment: Azelaic acid gel twice daily
Continue Regimen: 50+ SPF sunscreen daily.

## 2025-01-06 NOTE — PROCEDURE: ADDITIONAL NOTES
Additional Notes: Stopped both prescriptions in November.  She thought the Katarya was making it worse.\\n\\nHad a consultation with our cosmetic team.  They recommended a chemical peel.
Render Risk Assessment In Note?: no
Detail Level: Simple

## 2025-01-30 ENCOUNTER — APPOINTMENT (OUTPATIENT)
Dept: MEDICAL GROUP | Facility: PHYSICIAN GROUP | Age: 40
End: 2025-01-30
Payer: COMMERCIAL

## 2025-02-04 ENCOUNTER — APPOINTMENT (OUTPATIENT)
Dept: MEDICAL GROUP | Facility: PHYSICIAN GROUP | Age: 40
End: 2025-02-04
Payer: COMMERCIAL

## 2025-02-04 VITALS
SYSTOLIC BLOOD PRESSURE: 102 MMHG | TEMPERATURE: 98.7 F | DIASTOLIC BLOOD PRESSURE: 64 MMHG | HEART RATE: 94 BPM | WEIGHT: 148 LBS | HEIGHT: 67 IN | BODY MASS INDEX: 23.23 KG/M2 | OXYGEN SATURATION: 95 %

## 2025-02-04 DIAGNOSIS — D50.8 IRON DEFICIENCY ANEMIA SECONDARY TO INADEQUATE DIETARY IRON INTAKE: ICD-10-CM

## 2025-02-04 DIAGNOSIS — Z00.00 PREVENTATIVE HEALTH CARE: ICD-10-CM

## 2025-02-04 DIAGNOSIS — H49.9 OPHTHALMOPLEGIA: ICD-10-CM

## 2025-02-04 DIAGNOSIS — R09.82 POST-NASAL DRIP: ICD-10-CM

## 2025-02-04 DIAGNOSIS — E03.8 OTHER SPECIFIED HYPOTHYROIDISM: ICD-10-CM

## 2025-02-04 DIAGNOSIS — H52.31 ANISOMETROPIA: ICD-10-CM

## 2025-02-04 PROCEDURE — 3078F DIAST BP <80 MM HG: CPT | Performed by: NURSE PRACTITIONER

## 2025-02-04 PROCEDURE — 3074F SYST BP LT 130 MM HG: CPT | Performed by: NURSE PRACTITIONER

## 2025-02-04 PROCEDURE — 99214 OFFICE O/P EST MOD 30 MIN: CPT | Performed by: NURSE PRACTITIONER

## 2025-02-04 PROCEDURE — 2023F DILAT RTA XM W/O RTNOPTHY: CPT | Performed by: NURSE PRACTITIONER

## 2025-02-04 RX ORDER — FLUTICASONE PROPIONATE 50 MCG
1 SPRAY, SUSPENSION (ML) NASAL DAILY
Qty: 16 G | Refills: 0 | Status: SHIPPED | OUTPATIENT
Start: 2025-02-04

## 2025-02-04 ASSESSMENT — PATIENT HEALTH QUESTIONNAIRE - PHQ9: CLINICAL INTERPRETATION OF PHQ2 SCORE: 0

## 2025-02-04 NOTE — PROGRESS NOTES
"Subjective:     CC: Requesting labs, nasal congestion, referral needed    HPI:   Naila presents today with the following:    Nasal congestion  The nasal congestion started over 2 months ago. Report she was sick second week of November from daughter who was sick. She has been Taking OTC medications. Felt better after a week. Week later had fever, fatigue. She went to [facility] 11/26/2024 and diagnosed with bronchitis, given antibiotic and steroid. Now having left nasal congestion and mucous that started 1 month ago. Nasal congestion will open up and be intermittent. Can having burning sensation, watery eyes. Mucous smells \"yeasty\".  Denies fever, chills, sore throat, ear pain. Cough intermittently productive, mostly non-productive. 1 week ago start Tylenol Cold and Flu to help.      Other specified hypothyroidism  She has not been taking levothyroxine 25 mcg dose for the last month. She was having brain fog and stopped the medication.  Brain fog is better. Requesting labs. No constipation, weight gain, dry skin. Hair loss remained unchanged with medication.     Ophthalmoplegia  Previously had eye surgery as baby and 2009. She would like referral back to Dr. Hansen.     Past Medical History:   Diagnosis Date    Anemia 2005    Headache(784.0)     Herpes zoster without complication 7/2/2018    Hypertension 2005    PIH last 2 weeks of pregnancy       Social History     Tobacco Use    Smoking status: Never    Smokeless tobacco: Never   Vaping Use    Vaping status: Never Used   Substance Use Topics    Alcohol use: No    Drug use: No       Current Outpatient Medications Ordered in Epic   Medication Sig Dispense Refill    Acetaminophen (TYLENOL 8 HOUR PO) Take  by mouth.      fluticasone (FLONASE) 50 MCG/ACT nasal spray Administer 1 Spray into affected nostril(S) every day. 16 g 0    ferrous sulfate 325 (65 Fe) MG tablet Take 1 Tablet by mouth every 48 hours. 45 Tablet 1    cetirizine (ZYRTEC) 10 MG Tab Take 10 mg by mouth " "every day.      Ibuprofen (ADVIL PO) Take  by mouth. bid      levothyroxine (SYNTHROID) 25 MCG Tab Take 1 Tablet by mouth every morning on an empty stomach. (Patient not taking: Reported on 2/4/2025) 90 Tablet 3     No current Epic-ordered facility-administered medications on file.       Allergies:  Grass extracts [gramineae pollens], Metronidazole, Penicillins, and Tuna    Health Maintenance: Declines vaccines today.      Objective:     Vital signs reviewed  Exam:  /64 (BP Location: Left arm, Patient Position: Sitting, BP Cuff Size: Adult)   Pulse 94   Temp 37.1 °C (98.7 °F) (Temporal)   Ht 1.702 m (5' 7\")   Wt 67.1 kg (148 lb)   LMP 02/02/2025   SpO2 95%   BMI 23.18 kg/m²  Body mass index is 23.18 kg/m².    General: Normal appearing. No distress.  HENT: Normocephalic. Ears normal shape and contour, canals are clear bilaterally, tympanic membranes are pearly gray, nasal turbinates with swelling, oropharynx is with erythema and cobblestoning, no edema or exudates.  No pain to frontal or maxillary sinuses.  Eyes: Eyes conjunctiva clear lids without ptosis, pupils equal and reactive to light accommodation, lids normal.  Glasses in place.  Pulmonary: Clear to ausculation.  Normal effort. No rales, rhonchi, or wheezing.  Cardiovascular: Regular rate and rhythm without murmur.   Lymph: No cervical or supraclavicular lymph nodes are palpable.  Skin: Warm and dry.  No obvious lesions.  Psych: Normal mood and affect. Alert and oriented x3. Judgment and insight is normal.      Assessment & Plan:     39 y.o. female with the following -     1. Other specified hypothyroidism  Chronic exacerbated problem.  Hold levothyroxine at this time.  Check updated thyroid function.  - TSH WITH REFLEX TO FT4; Future    2. Post-nasal drip  Acute uncomplicated problem.  Reassurance that does not appear to be sinus infection.  She is afebrile.  Start Flonase nasal spray daily.  - fluticasone (FLONASE) 50 MCG/ACT nasal spray; " Administer 1 Spray into affected nostril(S) every day.  Dispense: 16 g; Refill: 0    3. Anisometropia  4. Ophthalmoplegia  Chronic exacerbated problem.  Referral back to ophthalmology.  - Referral to Ophthalmology    5. Iron deficiency anemia secondary to inadequate dietary iron intake  Chronic stable problem.  Due for updated labs.  - CBC WITH DIFFERENTIAL; Future  - IRON/TOTAL IRON BIND; Future  - FERRITIN; Future    6. Preventative health care  Acute uncomplicated problem.  Due for annual labs.  - CBC WITH DIFFERENTIAL; Future  - Comp Metabolic Panel; Future  - Lipid Profile; Future      Return for annual, Labs.    Please note that this dictation was created using voice recognition software. I have made every reasonable attempt to correct obvious errors, but I expect that there are errors of grammar and possibly content that I did not discover before finalizing the note.

## 2025-02-04 NOTE — ASSESSMENT & PLAN NOTE
She has not been taking levothyroxine 25 mcg dose for the last month. She was having brain fog and stopped the medication.  Brain fog is better. Requesting labs. No constipation, weight gain, dry skin. Hair loss remained unchanged with medication.

## 2025-04-04 DIAGNOSIS — R09.82 POST-NASAL DRIP: ICD-10-CM

## 2025-04-04 NOTE — TELEPHONE ENCOUNTER
Received request via: Pharmacy    Was the patient seen in the last year in this department? Yes    Does the patient have an active prescription (recently filled or refills available) for medication(s) requested? No    Pharmacy Name: [facility]    Does the patient have [facility] Plus and need 100-day supply? (This applies to ALL medications) Patient does not have SCP

## 2025-04-07 RX ORDER — FLUTICASONE PROPIONATE 50 MCG
1 SPRAY, SUSPENSION (ML) NASAL DAILY
Qty: 16 G | Refills: 3 | Status: SHIPPED | OUTPATIENT
Start: 2025-04-07

## 2025-04-07 NOTE — TELEPHONE ENCOUNTER
Requested Prescriptions     Signed Prescriptions Disp Refills    fluticasone (FLONASE) 50 MCG/ACT nasal spray 16 g 3     Sig: Administer 1 Spray into affected nostril(S) every day.     Authorizing Provider: NAHUM VELARDE A.P.R.N.